# Patient Record
Sex: FEMALE | Race: WHITE | Employment: OTHER | URBAN - METROPOLITAN AREA
[De-identification: names, ages, dates, MRNs, and addresses within clinical notes are randomized per-mention and may not be internally consistent; named-entity substitution may affect disease eponyms.]

---

## 2017-02-22 ENCOUNTER — HOSPITAL ENCOUNTER (OUTPATIENT)
Dept: SURGERY | Age: 67
Discharge: HOME | End: 2017-02-22
Attending: OPHTHALMOLOGY | Admitting: OPHTHALMOLOGY

## 2017-02-22 VITALS — OXYGEN SATURATION: 96 % | HEART RATE: 73 BPM | DIASTOLIC BLOOD PRESSURE: 78 MMHG | SYSTOLIC BLOOD PRESSURE: 150 MMHG

## 2017-02-22 RX ORDER — PREDNISOLONE ACETATE 10 MG/ML
1 SUSPENSION/ DROPS OPHTHALMIC
Status: COMPLETED | OUTPATIENT
Start: 2017-02-22 | End: 2017-02-22

## 2017-02-22 RX ORDER — PILOCARPINE HYDROCHLORIDE 20 MG/ML
1 SOLUTION/ DROPS OPHTHALMIC
Status: COMPLETED | OUTPATIENT
Start: 2017-02-22 | End: 2017-02-22

## 2017-02-22 RX ORDER — PROPARACAINE HYDROCHLORIDE 5 MG/ML
1 SOLUTION/ DROPS OPHTHALMIC
Status: COMPLETED | OUTPATIENT
Start: 2017-02-22 | End: 2017-02-22

## 2017-02-22 RX ADMIN — PILOCARPINE HYDROCHLORIDE 1 DROP: 20 SOLUTION/ DROPS OPHTHALMIC at 13:03

## 2017-02-22 RX ADMIN — PROPARACAINE HYDROCHLORIDE 1 DROP: 5 SOLUTION/ DROPS OPHTHALMIC at 13:45

## 2017-02-22 RX ADMIN — PREDNISOLONE ACETATE 1 DROP: 10 SUSPENSION/ DROPS OPHTHALMIC at 13:50

## 2017-03-22 ENCOUNTER — HOSPITAL ENCOUNTER (OUTPATIENT)
Dept: SURGERY | Age: 67
Discharge: HOME | End: 2017-03-22
Attending: OPHTHALMOLOGY | Admitting: OPHTHALMOLOGY

## 2017-03-22 VITALS — DIASTOLIC BLOOD PRESSURE: 78 MMHG | HEART RATE: 73 BPM | OXYGEN SATURATION: 97 % | SYSTOLIC BLOOD PRESSURE: 163 MMHG

## 2017-03-22 RX ORDER — PROPARACAINE HYDROCHLORIDE 5 MG/ML
1 SOLUTION/ DROPS OPHTHALMIC
Status: COMPLETED | OUTPATIENT
Start: 2017-03-22 | End: 2017-03-22

## 2017-03-22 RX ORDER — PREDNISOLONE ACETATE 10 MG/ML
1 SUSPENSION/ DROPS OPHTHALMIC
Status: COMPLETED | OUTPATIENT
Start: 2017-03-22 | End: 2017-03-22

## 2017-03-22 RX ORDER — PILOCARPINE HYDROCHLORIDE 20 MG/ML
1 SOLUTION/ DROPS OPHTHALMIC
Status: COMPLETED | OUTPATIENT
Start: 2017-03-22 | End: 2017-03-22

## 2017-03-22 RX ADMIN — PILOCARPINE HYDROCHLORIDE 1 DROP: 20 SOLUTION/ DROPS OPHTHALMIC at 13:20

## 2017-03-22 RX ADMIN — PROPARACAINE HYDROCHLORIDE 1 DROP: 5 SOLUTION/ DROPS OPHTHALMIC at 14:10

## 2017-03-22 RX ADMIN — PREDNISOLONE ACETATE 1 DROP: 10 SUSPENSION/ DROPS OPHTHALMIC at 14:16

## 2017-04-03 ENCOUNTER — OFFICE VISIT (OUTPATIENT)
Dept: PULMONOLOGY | Age: 67
End: 2017-04-03

## 2017-04-03 VITALS
HEART RATE: 78 BPM | WEIGHT: 200 LBS | RESPIRATION RATE: 16 BRPM | TEMPERATURE: 97.8 F | SYSTOLIC BLOOD PRESSURE: 122 MMHG | BODY MASS INDEX: 31.39 KG/M2 | OXYGEN SATURATION: 98 % | HEIGHT: 67 IN | DIASTOLIC BLOOD PRESSURE: 70 MMHG

## 2017-04-03 DIAGNOSIS — J44.9 ASTHMA WITH COPD (HCC): Primary | ICD-10-CM

## 2017-04-03 PROCEDURE — 3014F SCREEN MAMMO DOC REV: CPT | Performed by: INTERNAL MEDICINE

## 2017-04-03 PROCEDURE — 4040F PNEUMOC VAC/ADMIN/RCVD: CPT | Performed by: INTERNAL MEDICINE

## 2017-04-03 PROCEDURE — G8400 PT W/DXA NO RESULTS DOC: HCPCS | Performed by: INTERNAL MEDICINE

## 2017-04-03 PROCEDURE — 99213 OFFICE O/P EST LOW 20 MIN: CPT | Performed by: INTERNAL MEDICINE

## 2017-04-03 PROCEDURE — G8427 DOCREV CUR MEDS BY ELIG CLIN: HCPCS | Performed by: INTERNAL MEDICINE

## 2017-04-03 PROCEDURE — 1090F PRES/ABSN URINE INCON ASSESS: CPT | Performed by: INTERNAL MEDICINE

## 2017-04-03 PROCEDURE — G8417 CALC BMI ABV UP PARAM F/U: HCPCS | Performed by: INTERNAL MEDICINE

## 2017-04-03 PROCEDURE — 3023F SPIROM DOC REV: CPT | Performed by: INTERNAL MEDICINE

## 2017-04-03 PROCEDURE — 3017F COLORECTAL CA SCREEN DOC REV: CPT | Performed by: INTERNAL MEDICINE

## 2017-04-03 PROCEDURE — 1123F ACP DISCUSS/DSCN MKR DOCD: CPT | Performed by: INTERNAL MEDICINE

## 2017-04-03 PROCEDURE — G8926 SPIRO NO PERF OR DOC: HCPCS | Performed by: INTERNAL MEDICINE

## 2017-04-03 PROCEDURE — 1036F TOBACCO NON-USER: CPT | Performed by: INTERNAL MEDICINE

## 2017-04-03 RX ORDER — BUDESONIDE AND FORMOTEROL FUMARATE DIHYDRATE 80; 4.5 UG/1; UG/1
2 AEROSOL RESPIRATORY (INHALATION) 2 TIMES DAILY
Qty: 10.2 G | Refills: 5 | Status: SHIPPED | OUTPATIENT
Start: 2017-04-03 | End: 2018-06-29 | Stop reason: SDUPTHER

## 2017-04-03 RX ORDER — ALBUTEROL SULFATE 90 UG/1
2 AEROSOL, METERED RESPIRATORY (INHALATION) EVERY 6 HOURS PRN
Qty: 8 G | Refills: 5 | Status: SHIPPED | OUTPATIENT
Start: 2017-04-03 | End: 2019-09-23 | Stop reason: SDUPTHER

## 2017-04-04 ASSESSMENT — ENCOUNTER SYMPTOMS: SHORTNESS OF BREATH: 1

## 2017-04-07 ENCOUNTER — HOSPITAL ENCOUNTER (OUTPATIENT)
Dept: SURGERY | Age: 67
Discharge: HOME | End: 2017-04-07
Attending: ORTHOPAEDIC SURGERY | Admitting: ORTHOPAEDIC SURGERY

## 2017-04-07 VITALS
DIASTOLIC BLOOD PRESSURE: 59 MMHG | HEIGHT: 67 IN | HEART RATE: 84 BPM | RESPIRATION RATE: 17 BRPM | WEIGHT: 200 LBS | SYSTOLIC BLOOD PRESSURE: 131 MMHG | TEMPERATURE: 97 F | OXYGEN SATURATION: 97 % | BODY MASS INDEX: 31.39 KG/M2

## 2017-04-07 DIAGNOSIS — M48.02 CERVICAL STENOSIS OF SPINE: ICD-10-CM

## 2017-04-07 LAB
ABO/RH: NORMAL
ANION GAP SERPL CALCULATED.3IONS-SCNC: 9 MMOL/L (ref 3–16)
ANTIBODY SCREEN: NORMAL
BASOPHILS ABSOLUTE: 0 K/UL (ref 0–0.2)
BASOPHILS RELATIVE PERCENT: 0.5 %
BUN BLDV-MCNC: 12 MG/DL (ref 7–20)
CALCIUM SERPL-MCNC: 9.2 MG/DL (ref 8.3–10.6)
CHLORIDE BLD-SCNC: 101 MMOL/L (ref 99–110)
CO2: 30 MMOL/L (ref 21–32)
CREAT SERPL-MCNC: <0.5 MG/DL (ref 0.6–1.2)
EOSINOPHILS ABSOLUTE: 0.1 K/UL (ref 0–0.6)
EOSINOPHILS RELATIVE PERCENT: 2 %
GFR AFRICAN AMERICAN: >60
GFR NON-AFRICAN AMERICAN: >60
GLUCOSE BLD-MCNC: 104 MG/DL (ref 70–99)
HCT VFR BLD CALC: 39.1 % (ref 36–48)
HEMOGLOBIN: 13 G/DL (ref 12–16)
LYMPHOCYTES ABSOLUTE: 1.9 K/UL (ref 1–5.1)
LYMPHOCYTES RELATIVE PERCENT: 27.4 %
MCH RBC QN AUTO: 29.4 PG (ref 26–34)
MCHC RBC AUTO-ENTMCNC: 33.2 G/DL (ref 31–36)
MCV RBC AUTO: 88.5 FL (ref 80–100)
MONOCYTES ABSOLUTE: 0.7 K/UL (ref 0–1.3)
MONOCYTES RELATIVE PERCENT: 10 %
NEUTROPHILS ABSOLUTE: 4.1 K/UL (ref 1.7–7.7)
NEUTROPHILS RELATIVE PERCENT: 60.1 %
PDW BLD-RTO: 13.5 % (ref 12.4–15.4)
PLATELET # BLD: 257 K/UL (ref 135–450)
PMV BLD AUTO: 7.1 FL (ref 5–10.5)
POTASSIUM SERPL-SCNC: 4 MMOL/L (ref 3.5–5.1)
RBC # BLD: 4.41 M/UL (ref 4–5.2)
SODIUM BLD-SCNC: 140 MMOL/L (ref 136–145)
WBC # BLD: 6.9 K/UL (ref 4–11)

## 2017-04-07 RX ORDER — SODIUM CHLORIDE 0.9 % (FLUSH) 0.9 %
10 SYRINGE (ML) INJECTION EVERY 12 HOURS SCHEDULED
Status: DISCONTINUED | OUTPATIENT
Start: 2017-04-07 | End: 2017-04-08 | Stop reason: HOSPADM

## 2017-04-07 RX ORDER — LABETALOL HYDROCHLORIDE 5 MG/ML
5 INJECTION, SOLUTION INTRAVENOUS EVERY 10 MIN PRN
Status: DISCONTINUED | OUTPATIENT
Start: 2017-04-07 | End: 2017-04-08 | Stop reason: HOSPADM

## 2017-04-07 RX ORDER — DIPHENHYDRAMINE HYDROCHLORIDE 50 MG/ML
12.5 INJECTION INTRAMUSCULAR; INTRAVENOUS
Status: ACTIVE | OUTPATIENT
Start: 2017-04-07 | End: 2017-04-07

## 2017-04-07 RX ORDER — HYDROMORPHONE HCL 110MG/55ML
0.5 PATIENT CONTROLLED ANALGESIA SYRINGE INTRAVENOUS EVERY 5 MIN PRN
Status: DISCONTINUED | OUTPATIENT
Start: 2017-04-07 | End: 2017-04-08 | Stop reason: HOSPADM

## 2017-04-07 RX ORDER — OXYCODONE HYDROCHLORIDE AND ACETAMINOPHEN 5; 325 MG/1; MG/1
1 TABLET ORAL PRN
Status: ACTIVE | OUTPATIENT
Start: 2017-04-07 | End: 2017-04-07

## 2017-04-07 RX ORDER — MORPHINE SULFATE 4 MG/ML
2 INJECTION, SOLUTION INTRAMUSCULAR; INTRAVENOUS EVERY 5 MIN PRN
Status: DISCONTINUED | OUTPATIENT
Start: 2017-04-07 | End: 2017-04-08 | Stop reason: HOSPADM

## 2017-04-07 RX ORDER — HYDRALAZINE HYDROCHLORIDE 20 MG/ML
5 INJECTION INTRAMUSCULAR; INTRAVENOUS
Status: DISCONTINUED | OUTPATIENT
Start: 2017-04-07 | End: 2017-04-08 | Stop reason: HOSPADM

## 2017-04-07 RX ORDER — OXYCODONE HYDROCHLORIDE AND ACETAMINOPHEN 5; 325 MG/1; MG/1
2 TABLET ORAL PRN
Status: ACTIVE | OUTPATIENT
Start: 2017-04-07 | End: 2017-04-07

## 2017-04-07 RX ORDER — PREDNISONE 10 MG/1
10 TABLET ORAL DAILY
Qty: 10 TABLET | Refills: 0 | Status: SHIPPED | OUTPATIENT
Start: 2017-04-07 | End: 2017-04-17

## 2017-04-07 RX ORDER — MEPERIDINE HYDROCHLORIDE 25 MG/ML
12.5 INJECTION INTRAMUSCULAR; INTRAVENOUS; SUBCUTANEOUS EVERY 5 MIN PRN
Status: DISCONTINUED | OUTPATIENT
Start: 2017-04-07 | End: 2017-04-08 | Stop reason: HOSPADM

## 2017-04-07 RX ORDER — MORPHINE SULFATE 4 MG/ML
1 INJECTION, SOLUTION INTRAMUSCULAR; INTRAVENOUS EVERY 5 MIN PRN
Status: DISCONTINUED | OUTPATIENT
Start: 2017-04-07 | End: 2017-04-08 | Stop reason: HOSPADM

## 2017-04-07 RX ORDER — SODIUM CHLORIDE 0.9 % (FLUSH) 0.9 %
10 SYRINGE (ML) INJECTION PRN
Status: DISCONTINUED | OUTPATIENT
Start: 2017-04-07 | End: 2017-04-08 | Stop reason: HOSPADM

## 2017-04-07 RX ORDER — HYDROMORPHONE HCL 110MG/55ML
0.25 PATIENT CONTROLLED ANALGESIA SYRINGE INTRAVENOUS EVERY 5 MIN PRN
Status: DISCONTINUED | OUTPATIENT
Start: 2017-04-07 | End: 2017-04-08 | Stop reason: HOSPADM

## 2017-04-07 RX ORDER — HYDROCODONE BITARTRATE AND ACETAMINOPHEN 7.5; 325 MG/1; MG/1
1 TABLET ORAL EVERY 8 HOURS PRN
Qty: 40 TABLET | Refills: 0 | Status: SHIPPED | OUTPATIENT
Start: 2017-04-07 | End: 2017-04-14

## 2017-04-07 RX ORDER — SODIUM CHLORIDE, SODIUM LACTATE, POTASSIUM CHLORIDE, CALCIUM CHLORIDE 600; 310; 30; 20 MG/100ML; MG/100ML; MG/100ML; MG/100ML
INJECTION, SOLUTION INTRAVENOUS CONTINUOUS
Status: DISCONTINUED | OUTPATIENT
Start: 2017-04-07 | End: 2017-04-08 | Stop reason: HOSPADM

## 2017-04-07 RX ORDER — ACETAMINOPHEN 10 MG/ML
1000 INJECTION, SOLUTION INTRAVENOUS ONCE
Status: COMPLETED | OUTPATIENT
Start: 2017-04-07 | End: 2017-04-07

## 2017-04-07 RX ORDER — ONDANSETRON 2 MG/ML
4 INJECTION INTRAMUSCULAR; INTRAVENOUS
Status: COMPLETED | OUTPATIENT
Start: 2017-04-07 | End: 2017-04-07

## 2017-04-07 RX ORDER — LIDOCAINE HYDROCHLORIDE 10 MG/ML
0.3 INJECTION, SOLUTION EPIDURAL; INFILTRATION; INTRACAUDAL; PERINEURAL
Status: COMPLETED | OUTPATIENT
Start: 2017-04-07 | End: 2017-04-07

## 2017-04-07 RX ADMIN — SODIUM CHLORIDE, SODIUM LACTATE, POTASSIUM CHLORIDE, CALCIUM CHLORIDE: 600; 310; 30; 20 INJECTION, SOLUTION INTRAVENOUS at 06:52

## 2017-04-07 RX ADMIN — ONDANSETRON 4 MG: 2 INJECTION INTRAMUSCULAR; INTRAVENOUS at 10:55

## 2017-04-07 RX ADMIN — ACETAMINOPHEN 1000 MG: 10 INJECTION, SOLUTION INTRAVENOUS at 06:52

## 2017-04-07 RX ADMIN — LIDOCAINE HYDROCHLORIDE 0.3 ML: 10 INJECTION, SOLUTION EPIDURAL; INFILTRATION; INTRACAUDAL; PERINEURAL at 06:52

## 2017-04-07 ASSESSMENT — PAIN - FUNCTIONAL ASSESSMENT: PAIN_FUNCTIONAL_ASSESSMENT: 0-10

## 2017-04-21 ENCOUNTER — HOSPITAL ENCOUNTER (OUTPATIENT)
Dept: MRI IMAGING | Age: 67
Discharge: HOME | End: 2017-04-21
Admitting: ORTHOPAEDIC SURGERY

## 2017-04-21 DIAGNOSIS — M25.511 RIGHT SHOULDER PAIN, UNSPECIFIED CHRONICITY: ICD-10-CM

## 2017-04-21 DIAGNOSIS — M25.511 PAIN IN RIGHT SHOULDER: ICD-10-CM

## 2017-08-30 LAB
BUN BLDV-MCNC: 10 MG/DL (ref 5–19)
CREAT SERPL-MCNC: 0.7 MG/DL (ref 0.6–1.3)
GFR CALCULATED: 84

## 2017-08-31 ENCOUNTER — OFFICE VISIT (OUTPATIENT)
Dept: PULMONOLOGY | Age: 67
End: 2017-08-31

## 2017-08-31 VITALS
DIASTOLIC BLOOD PRESSURE: 80 MMHG | HEART RATE: 81 BPM | SYSTOLIC BLOOD PRESSURE: 132 MMHG | WEIGHT: 181 LBS | RESPIRATION RATE: 16 BRPM | HEIGHT: 67 IN | TEMPERATURE: 98.1 F | BODY MASS INDEX: 28.41 KG/M2 | OXYGEN SATURATION: 97 %

## 2017-08-31 DIAGNOSIS — J44.9 ASTHMA WITH COPD (HCC): Primary | ICD-10-CM

## 2017-08-31 PROCEDURE — G8926 SPIRO NO PERF OR DOC: HCPCS | Performed by: INTERNAL MEDICINE

## 2017-08-31 PROCEDURE — 3014F SCREEN MAMMO DOC REV: CPT | Performed by: INTERNAL MEDICINE

## 2017-08-31 PROCEDURE — 3017F COLORECTAL CA SCREEN DOC REV: CPT | Performed by: INTERNAL MEDICINE

## 2017-08-31 PROCEDURE — G8417 CALC BMI ABV UP PARAM F/U: HCPCS | Performed by: INTERNAL MEDICINE

## 2017-08-31 PROCEDURE — 4040F PNEUMOC VAC/ADMIN/RCVD: CPT | Performed by: INTERNAL MEDICINE

## 2017-08-31 PROCEDURE — 1123F ACP DISCUSS/DSCN MKR DOCD: CPT | Performed by: INTERNAL MEDICINE

## 2017-08-31 PROCEDURE — G8427 DOCREV CUR MEDS BY ELIG CLIN: HCPCS | Performed by: INTERNAL MEDICINE

## 2017-08-31 PROCEDURE — G8400 PT W/DXA NO RESULTS DOC: HCPCS | Performed by: INTERNAL MEDICINE

## 2017-08-31 PROCEDURE — 1090F PRES/ABSN URINE INCON ASSESS: CPT | Performed by: INTERNAL MEDICINE

## 2017-08-31 PROCEDURE — 3023F SPIROM DOC REV: CPT | Performed by: INTERNAL MEDICINE

## 2017-08-31 PROCEDURE — 99213 OFFICE O/P EST LOW 20 MIN: CPT | Performed by: INTERNAL MEDICINE

## 2017-08-31 PROCEDURE — 1036F TOBACCO NON-USER: CPT | Performed by: INTERNAL MEDICINE

## 2017-08-31 RX ORDER — NIFEDIPINE 30 MG/1
TABLET, EXTENDED RELEASE ORAL
COMMUNITY
Start: 2017-08-15 | End: 2022-10-18

## 2017-10-03 ENCOUNTER — TELEPHONE (OUTPATIENT)
Dept: PULMONOLOGY | Age: 67
End: 2017-10-03

## 2017-10-03 NOTE — TELEPHONE ENCOUNTER
Rec'd fax from [facility] stating that pt was willing to have an ONPO done. Per Dr. Gordon Espinoza, pt can be offered an appt to discuss. Spoke with pt and she states that [facility] reached out to her a couple weeks ago, but the pt is not requesting this. Pt was not sure why [address] is even asking for ONPO to be done. PT does not want to proceed with ONPO at this time.      OV 8/31/17:    ASSESSMENT AND PLAN:  Tobacco abuse   - quit smoking in 3/15.   - avoid smoking     Asthma with COPD   - Continue inhaled bronchodilator therapy ( albuterol prn); ok for symbicort 80 bid  - Patient is up to date with Pneumococcal vaccine in 2010   - Already quit smoking.   -- doing well

## 2018-03-07 ENCOUNTER — OFFICE VISIT (OUTPATIENT)
Dept: PULMONOLOGY | Age: 68
End: 2018-03-07

## 2018-03-07 VITALS
SYSTOLIC BLOOD PRESSURE: 120 MMHG | BODY MASS INDEX: 29 KG/M2 | DIASTOLIC BLOOD PRESSURE: 78 MMHG | HEIGHT: 67 IN | OXYGEN SATURATION: 96 % | WEIGHT: 184.8 LBS | RESPIRATION RATE: 20 BRPM | HEART RATE: 82 BPM

## 2018-03-07 DIAGNOSIS — R06.02 SHORTNESS OF BREATH: ICD-10-CM

## 2018-03-07 DIAGNOSIS — J44.9 ASTHMA WITH COPD (HCC): Primary | ICD-10-CM

## 2018-03-07 PROCEDURE — G8482 FLU IMMUNIZE ORDER/ADMIN: HCPCS | Performed by: INTERNAL MEDICINE

## 2018-03-07 PROCEDURE — G8427 DOCREV CUR MEDS BY ELIG CLIN: HCPCS | Performed by: INTERNAL MEDICINE

## 2018-03-07 PROCEDURE — 3023F SPIROM DOC REV: CPT | Performed by: INTERNAL MEDICINE

## 2018-03-07 PROCEDURE — 99213 OFFICE O/P EST LOW 20 MIN: CPT | Performed by: INTERNAL MEDICINE

## 2018-03-07 PROCEDURE — G8926 SPIRO NO PERF OR DOC: HCPCS | Performed by: INTERNAL MEDICINE

## 2018-03-07 PROCEDURE — 4040F PNEUMOC VAC/ADMIN/RCVD: CPT | Performed by: INTERNAL MEDICINE

## 2018-03-07 PROCEDURE — G8400 PT W/DXA NO RESULTS DOC: HCPCS | Performed by: INTERNAL MEDICINE

## 2018-03-07 PROCEDURE — 3017F COLORECTAL CA SCREEN DOC REV: CPT | Performed by: INTERNAL MEDICINE

## 2018-03-07 PROCEDURE — 1090F PRES/ABSN URINE INCON ASSESS: CPT | Performed by: INTERNAL MEDICINE

## 2018-03-07 PROCEDURE — G8419 CALC BMI OUT NRM PARAM NOF/U: HCPCS | Performed by: INTERNAL MEDICINE

## 2018-03-07 PROCEDURE — 3014F SCREEN MAMMO DOC REV: CPT | Performed by: INTERNAL MEDICINE

## 2018-03-07 PROCEDURE — 1123F ACP DISCUSS/DSCN MKR DOCD: CPT | Performed by: INTERNAL MEDICINE

## 2018-03-07 PROCEDURE — 1036F TOBACCO NON-USER: CPT | Performed by: INTERNAL MEDICINE

## 2018-03-07 NOTE — PROGRESS NOTES
cervical LAD. No supraclavicular LAD. M/S: No cyanosis. No synovitis or joint deformity. No clubbing. R leg wrapped. Neuro: Cranial nerves are grossly intact. Moving all extremities. Motor and sensation grossly intact. Psych: No anxiety or agitation. DATA:      LABS:      PFTs 11/11/15  FVC 2.96 (89%) FEV1 1.31 (54%) FEV1/FVC ratio  44%  TLC 6.69 (123%) DLCO 16.26 (84%)      IMAGING:      I personally reviewed and interpreted the following today in the office:    CXR 11/23/15: Heart size is normal. Mediastinal contours are normal. Pulmonary vascularity is normal. No focal lung consolidation noted.  Mild spurring  seen in the spine    ASSESSMENT AND PLAN:  Tobacco abuse   - quit smoking in 3/15.   - avoid smoking    Asthma with COPD   - Continue inhaled bronchodilator therapy ( albuterol prn); ok for symbicort 80 qd- bid  - Patient is up to date with Pneumococcal vaccine in 2010   - Already quit smoking.   - still doing ok

## 2018-06-29 RX ORDER — DILTIAZEM HYDROCHLORIDE 60 MG/1
TABLET, FILM COATED ORAL
Qty: 1 INHALER | Refills: 5 | Status: SHIPPED | OUTPATIENT
Start: 2018-06-29 | End: 2018-10-30 | Stop reason: SDUPTHER

## 2018-10-30 RX ORDER — DILTIAZEM HYDROCHLORIDE 60 MG/1
TABLET, FILM COATED ORAL
Qty: 10.2 G | Refills: 5 | Status: SHIPPED | OUTPATIENT
Start: 2018-10-30 | End: 2019-05-30 | Stop reason: SDUPTHER

## 2019-03-04 ENCOUNTER — TELEPHONE (OUTPATIENT)
Dept: PULMONOLOGY | Age: 69
End: 2019-03-04

## 2019-05-31 RX ORDER — BUDESONIDE AND FORMOTEROL FUMARATE DIHYDRATE 80; 4.5 UG/1; UG/1
AEROSOL RESPIRATORY (INHALATION)
Qty: 10.2 G | Refills: 5 | Status: SHIPPED | OUTPATIENT
Start: 2019-05-31 | End: 2019-09-23 | Stop reason: SDUPTHER

## 2019-09-23 ENCOUNTER — OFFICE VISIT (OUTPATIENT)
Dept: PULMONOLOGY | Age: 69
End: 2019-09-23
Payer: MEDICARE

## 2019-09-23 VITALS
DIASTOLIC BLOOD PRESSURE: 60 MMHG | HEIGHT: 67 IN | OXYGEN SATURATION: 97 % | SYSTOLIC BLOOD PRESSURE: 128 MMHG | HEART RATE: 75 BPM | WEIGHT: 158.6 LBS | RESPIRATION RATE: 16 BRPM | BODY MASS INDEX: 24.89 KG/M2 | TEMPERATURE: 97.7 F

## 2019-09-23 DIAGNOSIS — J44.9 ASTHMA WITH COPD (HCC): ICD-10-CM

## 2019-09-23 DIAGNOSIS — Z72.0 TOBACCO ABUSE: Primary | ICD-10-CM

## 2019-09-23 PROCEDURE — G8420 CALC BMI NORM PARAMETERS: HCPCS | Performed by: INTERNAL MEDICINE

## 2019-09-23 PROCEDURE — G8926 SPIRO NO PERF OR DOC: HCPCS | Performed by: INTERNAL MEDICINE

## 2019-09-23 PROCEDURE — 99213 OFFICE O/P EST LOW 20 MIN: CPT | Performed by: INTERNAL MEDICINE

## 2019-09-23 PROCEDURE — 3023F SPIROM DOC REV: CPT | Performed by: INTERNAL MEDICINE

## 2019-09-23 PROCEDURE — 1036F TOBACCO NON-USER: CPT | Performed by: INTERNAL MEDICINE

## 2019-09-23 PROCEDURE — 1090F PRES/ABSN URINE INCON ASSESS: CPT | Performed by: INTERNAL MEDICINE

## 2019-09-23 PROCEDURE — 4040F PNEUMOC VAC/ADMIN/RCVD: CPT | Performed by: INTERNAL MEDICINE

## 2019-09-23 PROCEDURE — G8427 DOCREV CUR MEDS BY ELIG CLIN: HCPCS | Performed by: INTERNAL MEDICINE

## 2019-09-23 PROCEDURE — G8400 PT W/DXA NO RESULTS DOC: HCPCS | Performed by: INTERNAL MEDICINE

## 2019-09-23 PROCEDURE — 3017F COLORECTAL CA SCREEN DOC REV: CPT | Performed by: INTERNAL MEDICINE

## 2019-09-23 PROCEDURE — 1123F ACP DISCUSS/DSCN MKR DOCD: CPT | Performed by: INTERNAL MEDICINE

## 2019-09-23 RX ORDER — THYROID, PORCINE 60 MG/1
65 TABLET ORAL DAILY
COMMUNITY
End: 2022-10-18

## 2019-09-23 RX ORDER — ALBUTEROL SULFATE 90 UG/1
2 AEROSOL, METERED RESPIRATORY (INHALATION) EVERY 6 HOURS PRN
Qty: 8 G | Refills: 11 | Status: SHIPPED | OUTPATIENT
Start: 2019-09-23 | End: 2020-05-20

## 2019-09-23 RX ORDER — BUDESONIDE AND FORMOTEROL FUMARATE DIHYDRATE 80; 4.5 UG/1; UG/1
AEROSOL RESPIRATORY (INHALATION)
Qty: 10.2 G | Refills: 11 | Status: SHIPPED | OUTPATIENT
Start: 2019-09-23 | End: 2020-10-01

## 2019-09-23 NOTE — PROGRESS NOTES
 SKIN GRAFT      TONSILLECTOMY         Allergies:  has No Known Allergies. Social History:    TOBACCO:   reports that she quit smoking about 4 years ago. Her smoking use included cigarettes. She has a 75.00 pack-year smoking history. She has never used smokeless tobacco.  ETOH:   reports that she drinks alcohol. Patient currently lives independently      Family History:       Adopted: Yes   Problem Relation Age of Onset    No Known Problems Mother     No Known Problems Father        Current Medications:    Current Outpatient Medications:     budesonide-formoterol (SYMBICORT) 80-4.5 MCG/ACT AERO, INHALE 2 PUFFS INTO THE LUNGS TWO TIMES A DAY, Disp: 10.2 g, Rfl: 5    Ergocalciferol (VITAMIN D2 PO), Take 1.25 mg by mouth Twice weekly', Disp: , Rfl:     NIFEdipine (PROCARDIA XL) 30 MG extended release tablet, , Disp: , Rfl:     albuterol sulfate  (90 BASE) MCG/ACT inhaler, Inhale 2 puffs into the lungs every 6 hours as needed for Wheezing, Disp: 8 g, Rfl: 5    omeprazole (PRILOSEC) 20 MG capsule, Take 40 mg by mouth daily , Disp: , Rfl:     albuterol (PROVENTIL) (2.5 MG/3ML) 0.083% nebulizer solution, Take 2.5 mg by nebulization as needed , Disp: , Rfl:     losartan (COZAAR) 25 MG tablet, Take 50 mg by mouth daily , Disp: , Rfl:     Progesterone Micronized (PROGESTERONE PO), Take 200 mg by mouth daily, Disp: , Rfl:     Nutritional Supplements (DHEA PO), DHEA  1 PO DAILY, Disp: , Rfl:     aspirin 81 MG tablet, Take 81 mg by mouth daily, Disp: , Rfl:       Objective:   PHYSICAL EXAM:        VITALS:    /60 (Site: Right Upper Arm, Position: Sitting)   Pulse 75   Temp 97.7 °F (36.5 °C) (Oral)   Resp 16   Ht 5' 7\" (1.702 m)   Wt 158 lb 9.6 oz (71.9 kg)   SpO2 97% Comment: RA  BMI 24.84 kg/m²     Constitutional: In no acute distress. Appears stated age. Comfortable. Eyes: PERRL. No sclera icterus. No conjunctival injection. ENT: No ocular or auricular discharge or visible masses.

## 2020-05-20 RX ORDER — ALBUTEROL SULFATE 90 MCG
HFA AEROSOL WITH ADAPTER (GRAM) INHALATION
Qty: 6.7 G | Refills: 5 | Status: SHIPPED | OUTPATIENT
Start: 2020-05-20 | End: 2020-08-27

## 2020-06-24 ENCOUNTER — TELEPHONE (OUTPATIENT)
Dept: SURGERY | Age: 70
End: 2020-06-24

## 2020-08-03 RX ORDER — ALBUTEROL SULFATE 90 MCG
HFA AEROSOL WITH ADAPTER (GRAM) INHALATION
Qty: 6.7 G | Refills: 0 | OUTPATIENT
Start: 2020-08-03

## 2020-08-27 RX ORDER — ALBUTEROL SULFATE 90 MCG
HFA AEROSOL WITH ADAPTER (GRAM) INHALATION
Qty: 6.7 G | Refills: 5 | Status: SHIPPED | OUTPATIENT
Start: 2020-08-27 | End: 2020-12-23 | Stop reason: SDUPTHER

## 2020-09-15 ENCOUNTER — TELEPHONE (OUTPATIENT)
Dept: PULMONOLOGY | Age: 70
End: 2020-09-15

## 2020-09-21 ENCOUNTER — VIRTUAL VISIT (OUTPATIENT)
Dept: PULMONOLOGY | Age: 70
End: 2020-09-21
Payer: MEDICARE

## 2020-09-21 PROCEDURE — 1090F PRES/ABSN URINE INCON ASSESS: CPT | Performed by: INTERNAL MEDICINE

## 2020-09-21 PROCEDURE — G8400 PT W/DXA NO RESULTS DOC: HCPCS | Performed by: INTERNAL MEDICINE

## 2020-09-21 PROCEDURE — 3017F COLORECTAL CA SCREEN DOC REV: CPT | Performed by: INTERNAL MEDICINE

## 2020-09-21 PROCEDURE — 4040F PNEUMOC VAC/ADMIN/RCVD: CPT | Performed by: INTERNAL MEDICINE

## 2020-09-21 PROCEDURE — 1123F ACP DISCUSS/DSCN MKR DOCD: CPT | Performed by: INTERNAL MEDICINE

## 2020-09-21 PROCEDURE — 99213 OFFICE O/P EST LOW 20 MIN: CPT | Performed by: INTERNAL MEDICINE

## 2020-09-21 PROCEDURE — G8427 DOCREV CUR MEDS BY ELIG CLIN: HCPCS | Performed by: INTERNAL MEDICINE

## 2020-09-21 NOTE — PROGRESS NOTES
2020    TELEHEALTH EVALUATION -- Audio/Visual (During DZNVA-71 public health emergency)    HPI:    Ariana Hein (:  1950) has requested an audio/video evaluation for the following concern(s): COPD    Since last clinic visit, the patient reports that she started smoking again- up to a pack a day. She feels more short of breath. She has been using symbicort as prescribed. She has been having a sore throat recently and went to her PMD.  She also notes dry mouth which she was concerned could be related to the Symbicort. Review of Systems    Prior to Visit Medications    Medication Sig Taking? Authorizing Provider   PROVENTIL  (90 Base) MCG/ACT inhaler INHALE 2 PUFFS BY MOUTH EVERY SIX HOURS AS NEEDED Yes Cordelia Rebolledo MD   Progesterone Micronized (PROGESTERONE PO) Take 200 mg by mouth daily Yes Historical Provider, MD   Nutritional Supplements (DHEA PO) DHEA   1 PO DAILY Yes Historical Provider, MD   thyroid (NATURE-THROID) 65 MG tablet Take 65 mg by mouth daily Yes Historical Provider, MD   UNABLE TO FIND Estrogen and progesterone pellets Yes Historical Provider, MD   budesonide-formoterol (SYMBICORT) 80-4.5 MCG/ACT AERO INHALE 2 PUFFS INTO THE LUNGS TWO TIMES A DAY Yes Cordelia Rebolledo MD   Ergocalciferol (VITAMIN D2 PO) Take 1.25 mg by mouth Twice weekly' Yes Historical Provider, MD   NIFEdipine (PROCARDIA XL) 30 MG extended release tablet  Yes Historical Provider, MD   omeprazole (PRILOSEC) 20 MG capsule Take 40 mg by mouth daily  Yes Historical Provider, MD   albuterol (PROVENTIL) (2.5 MG/3ML) 0.083% nebulizer solution Take 2.5 mg by nebulization as needed  Yes Historical Provider, MD       Social History     Tobacco Use    Smoking status: Former Smoker     Packs/day: 1.50     Years: 50.00     Pack years: 75.00     Types: Cigarettes     Last attempt to quit: 2015     Years since quittin.5    Smokeless tobacco: Never Used   Substance Use Topics    Alcohol use:  Yes Alcohol/week: 0.0 standard drinks     Comment: occassional    Drug use: No            PHYSICAL EXAMINATION:  [ INSTRUCTIONS:  \"[x]\" Indicates a positive item  \"[]\" Indicates a negative item  -- DELETE ALL ITEMS NOT EXAMINED]  Vital Signs: (As obtained by patient/caregiver or practitioner observation)    Blood pressure-  Heart rate-    Respiratory rate-    Temperature-  Pulse oximetry-     Constitutional: [x] Appears well-developed and well-nourished [x] No apparent distress      [] Abnormal-   Mental status  [x] Alert and awake  [x] Oriented to person/place/time [x]Able to follow commands      Eyes:  EOM    [x]  Normal  [] Abnormal-  Sclera  [x]  Normal  [] Abnormal -         Discharge [x]  None visible  [] Abnormal -    HENT:   [x] Normocephalic, atraumatic.   [] Abnormal   [x] Mouth/Throat: Mucous membranes are moist.     External Ears [x] Normal  [] Abnormal-     Neck: [x] No visualized mass     Pulmonary/Chest: [x] Respiratory effort normal.  [x] No visualized signs of difficulty breathing or respiratory distress        [] Abnormal-      Musculoskeletal:   [] Normal gait with no signs of ataxia         [x] Normal range of motion of neck        [] Abnormal-       Neurological:        [x] No Facial Asymmetry (Cranial nerve 7 motor function) (limited exam to video visit)          [x] No gaze palsy        [] Abnormal-         Skin:        [x] No significant exanthematous lesions or discoloration noted on facial skin         [] Abnormal-            Psychiatric:       [x] Normal Affect [x] No Hallucinations        [] Abnormal-     Other pertinent observable physical exam findings-     ASSESSMENT AND PLAN:  Tobacco abuse   - advise smoking cessation again- quit again on 9/17  - avoid smoking     Asthma with COPD   - Continue inhaled bronchodilator therapy ( albuterol prn); ok for symbicort 80 qd- bid  - Patient is up to date with Pneumococcal vaccine in 2010    - regular use of twice daily Symbicort- advised salt water gargling  -Recommend over-the-counter antihistamines      Kb Courtney is a 72 y.o. female being evaluated by a Virtual Visit (video visit) encounter to address concerns as mentioned above. A caregiver was present when appropriate. Due to this being a TeleHealth encounter (During CFAXZ-72 public health emergency), evaluation of the following organ systems was limited: Vitals/Constitutional/EENT/Resp/CV/GI//MS/Neuro/Skin/Heme-Lymph-Imm. Pursuant to the emergency declaration under the [address] [place] [address], [address] [place] [address] authority and the [employer] and [employer] Act, this Virtual Visit was conducted with patient's (and/or legal guardian's) consent, to reduce the patient's risk of exposure to COVID-19 and provide necessary medical care. The patient (and/or legal guardian) has also been advised to contact this office for worsening conditions or problems, and seek emergency medical treatment and/or call 911 if deemed necessary. Patient identification was verified at the start of the visit: Yes    Total time spent on this encounter: Not billed by time    Services were provided through a video synchronous discussion virtually to substitute for in-person clinic visit. Patient and provider were located at their individual homes. --Taty Berger MD on 9/21/2020 at 9:01 AM    An electronic signature was used to authenticate this note.       Orders  - F/u in 3 months

## 2020-10-01 RX ORDER — BUDESONIDE AND FORMOTEROL FUMARATE DIHYDRATE 80; 4.5 UG/1; UG/1
AEROSOL RESPIRATORY (INHALATION)
Qty: 1 INHALER | Refills: 5 | Status: SHIPPED | OUTPATIENT
Start: 2020-10-01 | End: 2021-04-16

## 2020-12-21 ENCOUNTER — VIRTUAL VISIT (OUTPATIENT)
Dept: PULMONOLOGY | Age: 70
End: 2020-12-21
Payer: MEDICARE

## 2020-12-21 ENCOUNTER — TELEPHONE (OUTPATIENT)
Dept: PULMONOLOGY | Age: 70
End: 2020-12-21

## 2020-12-21 PROCEDURE — 3017F COLORECTAL CA SCREEN DOC REV: CPT | Performed by: INTERNAL MEDICINE

## 2020-12-21 PROCEDURE — 1090F PRES/ABSN URINE INCON ASSESS: CPT | Performed by: INTERNAL MEDICINE

## 2020-12-21 PROCEDURE — G8427 DOCREV CUR MEDS BY ELIG CLIN: HCPCS | Performed by: INTERNAL MEDICINE

## 2020-12-21 PROCEDURE — G8400 PT W/DXA NO RESULTS DOC: HCPCS | Performed by: INTERNAL MEDICINE

## 2020-12-21 PROCEDURE — 1123F ACP DISCUSS/DSCN MKR DOCD: CPT | Performed by: INTERNAL MEDICINE

## 2020-12-21 PROCEDURE — 4040F PNEUMOC VAC/ADMIN/RCVD: CPT | Performed by: INTERNAL MEDICINE

## 2020-12-21 PROCEDURE — 99214 OFFICE O/P EST MOD 30 MIN: CPT | Performed by: INTERNAL MEDICINE

## 2020-12-21 RX ORDER — AMOXICILLIN AND CLAVULANATE POTASSIUM 875; 125 MG/1; MG/1
1 TABLET, FILM COATED ORAL EVERY 12 HOURS
COMMUNITY
Start: 2020-12-17 | End: 2020-12-24

## 2020-12-21 NOTE — PROGRESS NOTES
2020    TELEHEALTH EVALUATION -- Audio/Visual (During XKZCS-46 public health emergency)    HPI:    Charlene Riley (:  1950) has requested an audio/video evaluation for the following concern(s): COPD    Since last clinic visit, the patient reports she has been dealing with sinus congestion and swollen LN, earache. She was started on augmentin last week and is feeling better. She is smoking 1 ppd. Prior to Visit Medications    Medication Sig Taking? Authorizing Provider   budesonide-formoterol (SYMBICORT) 80-4.5 MCG/ACT AERO INHALE 2 PUFFS BY MOUTH TWO TIMES A DAY Yes Mike Dillon MD   PROVENTIL  (86 Base) MCG/ACT inhaler INHALE 2 PUFFS BY Arie Denver Yes Mike Dillon MD   Progesterone Micronized (PROGESTERONE PO) Take 200 mg by mouth daily Yes Historical Provider, MD   Nutritional Supplements (DHEA PO) DHEA   1 PO DAILY Yes Historical Provider, MD   thyroid (NATURE-THROID) 65 MG tablet Take 65 mg by mouth daily Yes Historical Provider, MD   UNABLE TO FIND Estrogen and progesterone pellets Yes Historical Provider, MD   Ergocalciferol (VITAMIN D2 PO) Take 1.25 mg by mouth once a week  Yes Historical Provider, MD   NIFEdipine (PROCARDIA XL) 30 MG extended release tablet  Yes Historical Provider, MD   omeprazole (PRILOSEC) 20 MG capsule Take 40 mg by mouth daily  Yes Historical Provider, MD   albuterol (PROVENTIL) (2.5 MG/3ML) 0.083% nebulizer solution Take 2.5 mg by nebulization as needed  Yes Historical Provider, MD       Social History     Tobacco Use    Smoking status: Former Smoker     Packs/day: 1.50     Years: 50.00     Pack years: 75.00     Types: Cigarettes     Quit date: 2015     Years since quittin.8    Smokeless tobacco: Never Used   Substance Use Topics    Alcohol use: Yes     Alcohol/week: 0.0 standard drinks     Comment: occassional    Drug use:  No            PHYSICAL EXAMINATION: - Continue inhaled bronchodilator therapy ( albuterol prn); symbicort 80 qd- bid  - Patient is up to date with Pneumococcal vaccine in 2010    - regular use of twice daily Symbicort- advised salt water gargling  -Recommend over-the-counter antihistamines      Lorie White is a 72 y.o. female being evaluated by a Virtual Visit (video visit) encounter to address concerns as mentioned above. A caregiver was present when appropriate. Due to this being a TeleHealth encounter (During BOCAS-31 public health emergency), evaluation of the following organ systems was limited: Vitals/Constitutional/EENT/Resp/CV/GI//MS/Neuro/Skin/Heme-Lymph-Imm. Pursuant to the emergency declaration under the [address] [place] [address] authority and the [employer] and [employer] Act, this Virtual Visit was conducted with patient's (and/or legal guardian's) consent, to reduce the patient's risk of exposure to COVID-19 and provide necessary medical care. The patient (and/or legal guardian) has also been advised to contact this office for worsening conditions or problems, and seek emergency medical treatment and/or call 911 if deemed necessary. Patient identification was verified at the start of the visit: Yes    Total time spent on this encounter: Not billed by time    Services were provided through a video synchronous discussion virtually to substitute for in-person clinic visit. Patient and provider were located at their individual homes. --Juhi Chavira MD on 12/21/2020 at 8:49 AM    An electronic signature was used to authenticate this note.       Orders  - LDCT of chest in 1/21  - f/u after chest CT

## 2020-12-21 NOTE — PATIENT INSTRUCTIONS
Remember to bring a list of pulmonary medications and any CPAP or BiPAP machines to your next appointment with the office. Please keep all of your future appointments scheduled by [facility] Pulmonary office. Out of respect for other patients and providers, you may be asked to reschedule your appointment if you arrive later than your scheduled appointment time. Appointments cancelled less than 24hrs in advance will be considered a no show. Patients with three missed appointments within 1 year or four missed appointments within 2 years can be dismissed from the practice. You may receive a survey regarding the care you received during your visit. Your input is valuable to us. We encourage you to complete and return your survey. We hope you will choose us in the future for your healthcare needs. Pt instructed of all future appointment dates & times, including radiology, labs, procedures & referrals. If procedures were scheduled preparation instructions provided. Instructions on future appointments with [facility] Pulmonary were given.

## 2020-12-21 NOTE — TELEPHONE ENCOUNTER
After visit care:    LDCT 1/2021  F/u VV after CT    Scheduled appts for pt, [facility] to inform pt of appts. OBHx: primigravid     GynHx: denies h/o fibroids, ovarian cysts, abnormal pap smears, STIs.

## 2020-12-23 ENCOUNTER — TELEPHONE (OUTPATIENT)
Dept: PULMONOLOGY | Age: 70
End: 2020-12-23

## 2020-12-23 RX ORDER — ALBUTEROL SULFATE 90 UG/1
AEROSOL, METERED RESPIRATORY (INHALATION)
Qty: 18 G | Refills: 5 | Status: SHIPPED | OUTPATIENT
Start: 2020-12-23 | End: 2021-04-05

## 2020-12-23 NOTE — TELEPHONE ENCOUNTER
Patient calling requesting a refill on her Proventil. Last Rx was sent in 8/2020 with 5 refills. Patient stats she does not have anymore and sometime she goes through 1 inhaler in 2 weeks. She kept stating she pays for this inhaler out of pocket so she should be able to get it when she needs it. She would like it sent to [facility]. Please advise. LOV: 12/21/20    ASSESSMENT AND PLAN:  Tobacco abuse   - advise smoking cessation again- she is back to 1 ppd  - avoid smoking  - discussed the  recommendations of the USPSTF for Lung Cancer Screening (>30 pack year history) with Low Dose CT annually, including the risks and benefits of both screening and not screening.   It is recommended that this patient obtain annual low dose screening CT scans, however, she is agreeable.     Asthma with COPD   - Continue inhaled bronchodilator therapy ( albuterol prn); symbicort 80 qd- bid  - Patient is up to date with Pneumococcal vaccine in 2010    - regular use of twice daily Symbicort- advised salt water gargling  -Recommend over-the-counter antihistamines

## 2021-01-19 ENCOUNTER — HOSPITAL ENCOUNTER (OUTPATIENT)
Dept: CT IMAGING | Age: 71
Discharge: HOME | End: 2021-01-19
Payer: MEDICARE

## 2021-01-19 DIAGNOSIS — Z87.891 PERSONAL HISTORY OF TOBACCO USE: ICD-10-CM

## 2021-01-19 PROCEDURE — 71271 CT THORAX LUNG CANCER SCR C-: CPT

## 2021-01-22 ENCOUNTER — VIRTUAL VISIT (OUTPATIENT)
Dept: PULMONOLOGY | Age: 71
End: 2021-01-22
Payer: MEDICARE

## 2021-01-22 DIAGNOSIS — J44.9 ASTHMA WITH COPD (HCC): Primary | ICD-10-CM

## 2021-01-22 DIAGNOSIS — Z72.0 TOBACCO ABUSE: ICD-10-CM

## 2021-01-22 PROCEDURE — 1090F PRES/ABSN URINE INCON ASSESS: CPT | Performed by: INTERNAL MEDICINE

## 2021-01-22 PROCEDURE — 4040F PNEUMOC VAC/ADMIN/RCVD: CPT | Performed by: INTERNAL MEDICINE

## 2021-01-22 PROCEDURE — 1123F ACP DISCUSS/DSCN MKR DOCD: CPT | Performed by: INTERNAL MEDICINE

## 2021-01-22 PROCEDURE — G8400 PT W/DXA NO RESULTS DOC: HCPCS | Performed by: INTERNAL MEDICINE

## 2021-01-22 PROCEDURE — G8427 DOCREV CUR MEDS BY ELIG CLIN: HCPCS | Performed by: INTERNAL MEDICINE

## 2021-01-22 PROCEDURE — 3017F COLORECTAL CA SCREEN DOC REV: CPT | Performed by: INTERNAL MEDICINE

## 2021-01-22 PROCEDURE — 99213 OFFICE O/P EST LOW 20 MIN: CPT | Performed by: INTERNAL MEDICINE

## 2021-01-22 RX ORDER — MELOXICAM 15 MG/1
1 TABLET ORAL DAILY
COMMUNITY
Start: 2021-01-04 | End: 2021-08-18 | Stop reason: CLARIF

## 2021-01-22 NOTE — PROGRESS NOTES
2021    TELEHEALTH EVALUATION -- Audio/Visual (During [facility]-43 public health emergency)    HPI:    Latha Miller (:  1950) has requested an audio/video evaluation for the following concern(s): COPD    Since last clinic visit, the patient reports she has been doing slightly better, less dyspnea. She is smoking <1 ppd (15 cigs per day). Prior to Visit Medications    Medication Sig Taking? Authorizing Provider   albuterol sulfate HFA (PROVENTIL HFA) 108 (90 Base) MCG/ACT inhaler INHALE 2 PUFFS BY MOUTH EVERY SIX HOURS AS NEEDED  Dominick Neal MD   budesonide-formoterol (SYMBICORT) 80-4.5 MCG/ACT AERO INHALE 2 PUFFS BY MOUTH TWO TIMES Luis Kelly MD   Progesterone Micronized (PROGESTERONE PO) Take 200 mg by mouth daily  Historical Provider, MD   Nutritional Supplements (DHEA PO) DHEA   1 PO DAILY  Historical Provider, MD   thyroid (NATURE-THROID) 65 MG tablet Take 65 mg by mouth daily  Historical Provider, MD   UNABLE TO FIND Estrogen and progesterone pellets  Historical Provider, MD   Ergocalciferol (VITAMIN D2 PO) Take 1.25 mg by mouth once a week   Historical Provider, MD   NIFEdipine (PROCARDIA XL) 30 MG extended release tablet   Historical Provider, MD   omeprazole (PRILOSEC) 20 MG capsule Take 40 mg by mouth daily   Historical Provider, MD   albuterol (PROVENTIL) (2.5 MG/3ML) 0.083% nebulizer solution Take 2.5 mg by nebulization as needed   Historical Provider, MD       Social History     Tobacco Use    Smoking status: Former Smoker     Packs/day: 1.50     Years: 50.00     Pack years: 75.00     Types: Cigarettes     Quit date: 2015     Years since quittin.9    Smokeless tobacco: Never Used   Substance Use Topics    Alcohol use: Yes     Alcohol/week: 0.0 standard drinks     Comment: occassional    Drug use:  No            PHYSICAL EXAMINATION:  [ INSTRUCTIONS:  \"[x]\" Indicates a positive item  \"[]\" Indicates a negative item  -- DELETE ALL ITEMS NOT EXAMINED] Vital Signs: (As obtained by patient/caregiver or practitioner observation)    Blood pressure-  Heart rate-    Respiratory rate-    Temperature-  Pulse oximetry-     Constitutional: [x] Appears well-developed and well-nourished [x] No apparent distress      [] Abnormal-   Mental status  [x] Alert and awake  [x] Oriented to person/place/time [x]Able to follow commands      Eyes:  EOM    [x]  Normal  [] Abnormal-  Sclera  [x]  Normal  [] Abnormal -         Discharge [x]  None visible  [] Abnormal -    HENT:   [x] Normocephalic, atraumatic. [] Abnormal   [x] Mouth/Throat: Mucous membranes are moist.     External Ears [x] Normal  [] Abnormal-     Neck: [x] No visualized mass     Pulmonary/Chest: [x] Respiratory effort normal.  [x] No visualized signs of difficulty breathing or respiratory distress        [] Abnormal-      Musculoskeletal:   [] Normal gait with no signs of ataxia         [x] Normal range of motion of neck        [] Abnormal-       Neurological:        [x] No Facial Asymmetry (Cranial nerve 7 motor function) (limited exam to video visit)          [x] No gaze palsy        [] Abnormal-         Skin:        [x] No significant exanthematous lesions or discoloration noted on facial skin         [] Abnormal-            Psychiatric:       [x] Normal Affect [x] No Hallucinations        [] Abnormal-     Other pertinent observable physical exam findings-     I personally reviewed and interpreted the following today in the office:     Chest LDCT 1/19/21:  Lungs/Pleura:  Mild underlying emphysema is seen.  Linear and curvilinear  opacities are seen in the apices, compatible with scarring.  Scattered areas  of bronchial wall thickening are seen.  No pneumonia.  No edema.  No pleural  effusion.  Noncalcified pulmonary nodule seen in the right upper lobe  measuring 3 mm.         ASSESSMENT AND PLAN:  Tobacco abuse   - advise smoking cessation again- she is back to 1 ppd  - avoid smoking - discussed the  recommendations of the USPSTF for Lung Cancer Screening (>30 pack year history) with Low Dose CT annually, including the risks and benefits of both screening and not screening. It is recommended that this patient obtain annual low dose screening CT scans, however, she was agreeable. - next LDCT due in 1/22     Asthma with COPD   - Continue inhaled bronchodilator therapy ( albuterol prn); symbicort 80 qd- bid  - Patient is up to date with Pneumococcal vaccine in 2010    - regular use of twice daily Symbicort- advised salt water gargling  -Recommend over-the-counter antihistamines      Betsy Eisenmenger is a 72 y.o. female being evaluated by a Virtual Visit (video visit) encounter to address concerns as mentioned above. A caregiver was present when appropriate. Due to this being a TeleHealth encounter (During VJDLR-03 public health emergency), evaluation of the following organ systems was limited: Vitals/Constitutional/EENT/Resp/CV/GI//MS/Neuro/Skin/Heme-Lymph-Imm. Pursuant to the emergency declaration under the [address] [place] [address], [address] [place] [address] authority and the [employer] and [employer] Act, this Virtual Visit was conducted with patient's (and/or legal guardian's) consent, to reduce the patient's risk of exposure to COVID-19 and provide necessary medical care. The patient (and/or legal guardian) has also been advised to contact this office for worsening conditions or problems, and seek emergency medical treatment and/or call 911 if deemed necessary. Patient identification was verified at the start of the visit: Yes    Total time spent on this encounter: Not billed by time    Services were provided through a video synchronous discussion virtually to substitute for in-person clinic visit. Patient and provider were located at their individual homes.     --Damien Aragon MD on 1/22/2021 at 9:21 AM An electronic signature was used to authenticate this note.       Orders  - f/u in 6 months  - she is interested in covid vaccine when available- please put on list

## 2021-02-17 ENCOUNTER — IMMUNIZATION (OUTPATIENT)
Dept: PRIMARY CARE CLINIC | Age: 71
End: 2021-02-17
Payer: MEDICARE

## 2021-02-17 PROCEDURE — 0011A COVID-19, MODERNA VACCINE 100MCG/0.5ML DOSE: CPT | Performed by: FAMILY MEDICINE

## 2021-02-17 PROCEDURE — 91301 COVID-19, MODERNA VACCINE 100MCG/0.5ML DOSE: CPT | Performed by: FAMILY MEDICINE

## 2021-03-17 ENCOUNTER — IMMUNIZATION (OUTPATIENT)
Dept: PRIMARY CARE CLINIC | Age: 71
End: 2021-03-17
Payer: MEDICARE

## 2021-03-17 PROCEDURE — 0012A COVID-19, MODERNA VACCINE 100MCG/0.5ML DOSE: CPT | Performed by: FAMILY MEDICINE

## 2021-03-17 PROCEDURE — 91301 COVID-19, MODERNA VACCINE 100MCG/0.5ML DOSE: CPT | Performed by: FAMILY MEDICINE

## 2021-04-05 RX ORDER — ALBUTEROL SULFATE 90 UG/1
AEROSOL, METERED RESPIRATORY (INHALATION)
Qty: 6.7 G | Refills: 5 | Status: SHIPPED | OUTPATIENT
Start: 2021-04-05 | End: 2021-08-18 | Stop reason: SDUPTHER

## 2021-04-16 RX ORDER — BUDESONIDE AND FORMOTEROL FUMARATE DIHYDRATE 80; 4.5 UG/1; UG/1
AEROSOL RESPIRATORY (INHALATION)
Qty: 10.2 G | Refills: 6 | Status: SHIPPED | OUTPATIENT
Start: 2021-04-16 | End: 2021-08-18 | Stop reason: SDUPTHER

## 2021-08-18 ENCOUNTER — OFFICE VISIT (OUTPATIENT)
Dept: PULMONOLOGY | Age: 71
End: 2021-08-18
Payer: MEDICARE

## 2021-08-18 VITALS
BODY MASS INDEX: 20.99 KG/M2 | WEIGHT: 134 LBS | DIASTOLIC BLOOD PRESSURE: 84 MMHG | HEART RATE: 95 BPM | OXYGEN SATURATION: 96 % | SYSTOLIC BLOOD PRESSURE: 120 MMHG

## 2021-08-18 DIAGNOSIS — Z72.0 TOBACCO ABUSE: Primary | ICD-10-CM

## 2021-08-18 DIAGNOSIS — J44.9 ASTHMA WITH COPD (HCC): ICD-10-CM

## 2021-08-18 PROCEDURE — G8427 DOCREV CUR MEDS BY ELIG CLIN: HCPCS | Performed by: INTERNAL MEDICINE

## 2021-08-18 PROCEDURE — 1123F ACP DISCUSS/DSCN MKR DOCD: CPT | Performed by: INTERNAL MEDICINE

## 2021-08-18 PROCEDURE — 3023F SPIROM DOC REV: CPT | Performed by: INTERNAL MEDICINE

## 2021-08-18 PROCEDURE — 3017F COLORECTAL CA SCREEN DOC REV: CPT | Performed by: INTERNAL MEDICINE

## 2021-08-18 PROCEDURE — 4004F PT TOBACCO SCREEN RCVD TLK: CPT | Performed by: INTERNAL MEDICINE

## 2021-08-18 PROCEDURE — 4040F PNEUMOC VAC/ADMIN/RCVD: CPT | Performed by: INTERNAL MEDICINE

## 2021-08-18 PROCEDURE — 1090F PRES/ABSN URINE INCON ASSESS: CPT | Performed by: INTERNAL MEDICINE

## 2021-08-18 PROCEDURE — G8926 SPIRO NO PERF OR DOC: HCPCS | Performed by: INTERNAL MEDICINE

## 2021-08-18 PROCEDURE — 99213 OFFICE O/P EST LOW 20 MIN: CPT | Performed by: INTERNAL MEDICINE

## 2021-08-18 PROCEDURE — G8420 CALC BMI NORM PARAMETERS: HCPCS | Performed by: INTERNAL MEDICINE

## 2021-08-18 PROCEDURE — G8400 PT W/DXA NO RESULTS DOC: HCPCS | Performed by: INTERNAL MEDICINE

## 2021-08-18 RX ORDER — ALBUTEROL SULFATE 2.5 MG/3ML
2.5 SOLUTION RESPIRATORY (INHALATION) EVERY 6 HOURS PRN
Qty: 120 EACH | Refills: 5 | Status: SHIPPED | OUTPATIENT
Start: 2021-08-18

## 2021-08-18 RX ORDER — ALBUTEROL SULFATE 90 UG/1
AEROSOL, METERED RESPIRATORY (INHALATION)
Qty: 6.7 G | Refills: 5 | Status: SHIPPED | OUTPATIENT
Start: 2021-08-18

## 2021-08-18 RX ORDER — BUDESONIDE AND FORMOTEROL FUMARATE DIHYDRATE 80; 4.5 UG/1; UG/1
AEROSOL RESPIRATORY (INHALATION)
Qty: 10.2 G | Refills: 5 | Status: SHIPPED | OUTPATIENT
Start: 2021-08-18 | End: 2022-09-06 | Stop reason: ALTCHOICE

## 2021-08-18 NOTE — PROGRESS NOTES
cigarettes. She has a 75.00 pack-year smoking history. She has never used smokeless tobacco.  ETOH:   reports current alcohol use. Patient currently lives independently      Family History:       Adopted: Yes   Problem Relation Age of Onset    No Known Problems Mother     No Known Problems Father        Current Medications:    Current Outpatient Medications:     budesonide-formoterol (SYMBICORT) 80-4.5 MCG/ACT AERO, INHALE 2 PUFFS BY MOUTH TWO TIMES A DAY, Disp: 10.2 g, Rfl: 6    albuterol sulfate HFA (PROVENTIL HFA) 108 (90 Base) MCG/ACT inhaler, INHALE 2 PUFFS BY MOUTH EVERY SIX HOURS AS NEEDED, Disp: 6.7 g, Rfl: 5    Progesterone Micronized (PROGESTERONE PO), Take 200 mg by mouth daily, Disp: , Rfl:     Nutritional Supplements (DHEA PO), DHEA  1 PO DAILY, Disp: , Rfl:     thyroid (NATURE-THROID) 65 MG tablet, Take 65 mg by mouth daily, Disp: , Rfl:     UNABLE TO FIND, Estrogen and progesterone pellets, Disp: , Rfl:     Ergocalciferol (VITAMIN D2 PO), Take 1.25 mg by mouth once a week , Disp: , Rfl:     NIFEdipine (PROCARDIA XL) 30 MG extended release tablet, , Disp: , Rfl:     omeprazole (PRILOSEC) 20 MG capsule, Take 40 mg by mouth daily , Disp: , Rfl:     albuterol (PROVENTIL) (2.5 MG/3ML) 0.083% nebulizer solution, Take 2.5 mg by nebulization as needed , Disp: , Rfl:       Objective:   PHYSICAL EXAM:        VITALS:    /84   Pulse 95   Wt 134 lb (60.8 kg)   SpO2 96% Comment: room air  BMI 20.99 kg/m²     Constitutional: In no acute distress. Appears stated age. Comfortable. Eyes: PERRL. No sclera icterus. No conjunctival injection. ENT: No ocular or auricular discharge or visible masses. Oropharynx clear. Neck: Trachea midline. Resp: No accessory muscle use. No crackles. No wheezes. No rhonchi. No dullness on percussion. CTA B  CV: Regular rate. Regular rhythm. No murmur or rub. Normal S1 and S2. No edema. GI: Abdomen non-tender. Non-distended. No masses. Skin: Warm and dry. No nodules on exposed extremities. No rash on exposed extremities. M/S: No cyanosis. No synovitis or joint deformity. No clubbing. Neuro: Cranial nerves are grossly intact. Moving all extremities. Motor and sensation grossly intact. Psych: No anxiety or agitation. DATA:      LABS:      PFTs 11/11/15  FVC 2.96 (89%) FEV1 1.31 (54%) FEV1/FVC ratio  44%  TLC 6.69 (123%) DLCO 16.26 (84%)      IMAGING:      Chest LDCT 1/19/21:  Lungs/Pleura:  Mild underlying emphysema is seen.  Linear and curvilinear  opacities are seen in the apices, compatible with scarring.  Scattered areas  of bronchial wall thickening are seen.  No pneumonia.  No edema.  No pleural  effusion.  Noncalcified pulmonary nodule seen in the right upper lobe  measuring 3 mm.           ASSESSMENT AND PLAN:  Tobacco abuse   - advise smoking cessation again- she is still smoking 1 ppd  - discussed the  recommendations of the USPSTF for Lung Cancer Screening (>30 pack year history) with Low Dose CT annually, including the risks and benefits of both screening and not screening. It is recommended that this patient obtain annual low dose screening CT scans, however, she was agreeable. - next LDCT due in 1/22     Asthma with COPD   - Continue inhaled bronchodilator therapy ( albuterol prn); symbicort 80 qd- bid  - Patient is up to date with Pneumococcal vaccine in 2010    - regular use of twice daily Symbicort- advised salt water gargling  -Recommend over-the-counter antihistaminestamine for the next 1 to 2 months. Informed patient of my impending departure from the practice. I will refer to new pulmonologist for ongoing care- [facility] pulm.

## 2021-12-27 ENCOUNTER — TELEPHONE (OUTPATIENT)
Dept: CASE MANAGEMENT | Age: 71
End: 2021-12-27

## 2021-12-27 NOTE — TELEPHONE ENCOUNTER
Patient due for annual CT Lung Screening. Reminder letter mailed.     Mary Jo Sampson [address] Lung Navigator  [phone]

## 2022-01-02 ENCOUNTER — TELEPHONE (OUTPATIENT)
Dept: CASE MANAGEMENT | Age: 72
End: 2022-01-02

## 2022-01-03 NOTE — TELEPHONE ENCOUNTER
Patient due for annual CT Lung Screening. Reminder letter mailed.     Mary Jo Sampson [address] Lung Navigator  [phone]

## 2022-01-27 ENCOUNTER — HOSPITAL ENCOUNTER (OUTPATIENT)
Dept: CT IMAGING | Age: 72
Discharge: HOME | End: 2022-01-27
Payer: MEDICARE

## 2022-01-27 DIAGNOSIS — Z12.2 SCREENING FOR LUNG CANCER: ICD-10-CM

## 2022-01-27 PROCEDURE — 71271 CT THORAX LUNG CANCER SCR C-: CPT

## 2022-02-16 ENCOUNTER — OFFICE VISIT (OUTPATIENT)
Dept: PULMONOLOGY | Age: 72
End: 2022-02-16
Payer: MEDICARE

## 2022-02-16 VITALS
OXYGEN SATURATION: 96 % | RESPIRATION RATE: 18 BRPM | HEIGHT: 67 IN | SYSTOLIC BLOOD PRESSURE: 150 MMHG | TEMPERATURE: 97.4 F | WEIGHT: 131.8 LBS | HEART RATE: 94 BPM | BODY MASS INDEX: 20.69 KG/M2 | DIASTOLIC BLOOD PRESSURE: 85 MMHG

## 2022-02-16 DIAGNOSIS — Z72.0 TOBACCO ABUSE: ICD-10-CM

## 2022-02-16 DIAGNOSIS — J44.9 ASTHMA WITH COPD (HCC): Primary | ICD-10-CM

## 2022-02-16 DIAGNOSIS — J43.2 CENTRILOBULAR EMPHYSEMA (HCC): ICD-10-CM

## 2022-02-16 DIAGNOSIS — K21.9 GASTROESOPHAGEAL REFLUX DISEASE, UNSPECIFIED WHETHER ESOPHAGITIS PRESENT: ICD-10-CM

## 2022-02-16 DIAGNOSIS — R06.02 SHORTNESS OF BREATH: ICD-10-CM

## 2022-02-16 DIAGNOSIS — I25.10 CORONARY ARTERY CALCIFICATION SEEN ON CT SCAN: ICD-10-CM

## 2022-02-16 PROCEDURE — 3023F SPIROM DOC REV: CPT | Performed by: INTERNAL MEDICINE

## 2022-02-16 PROCEDURE — 1090F PRES/ABSN URINE INCON ASSESS: CPT | Performed by: INTERNAL MEDICINE

## 2022-02-16 PROCEDURE — 3017F COLORECTAL CA SCREEN DOC REV: CPT | Performed by: INTERNAL MEDICINE

## 2022-02-16 PROCEDURE — G8420 CALC BMI NORM PARAMETERS: HCPCS | Performed by: INTERNAL MEDICINE

## 2022-02-16 PROCEDURE — G8400 PT W/DXA NO RESULTS DOC: HCPCS | Performed by: INTERNAL MEDICINE

## 2022-02-16 PROCEDURE — 1123F ACP DISCUSS/DSCN MKR DOCD: CPT | Performed by: INTERNAL MEDICINE

## 2022-02-16 PROCEDURE — 99214 OFFICE O/P EST MOD 30 MIN: CPT | Performed by: INTERNAL MEDICINE

## 2022-02-16 PROCEDURE — G8427 DOCREV CUR MEDS BY ELIG CLIN: HCPCS | Performed by: INTERNAL MEDICINE

## 2022-02-16 PROCEDURE — 4004F PT TOBACCO SCREEN RCVD TLK: CPT | Performed by: INTERNAL MEDICINE

## 2022-02-16 PROCEDURE — G8484 FLU IMMUNIZE NO ADMIN: HCPCS | Performed by: INTERNAL MEDICINE

## 2022-02-16 PROCEDURE — 4040F PNEUMOC VAC/ADMIN/RCVD: CPT | Performed by: INTERNAL MEDICINE

## 2022-02-16 RX ORDER — BUDESONIDE, GLYCOPYRROLATE, AND FORMOTEROL FUMARATE 160; 9; 4.8 UG/1; UG/1; UG/1
2 AEROSOL, METERED RESPIRATORY (INHALATION) 2 TIMES DAILY
Qty: 2 EACH | Refills: 0
Start: 2022-02-16 | End: 2022-10-18

## 2022-02-16 RX ORDER — ATORVASTATIN CALCIUM 20 MG/1
TABLET, FILM COATED ORAL
COMMUNITY
Start: 2022-01-17

## 2022-02-16 ASSESSMENT — SLEEP AND FATIGUE QUESTIONNAIRES
HOW LIKELY ARE YOU TO NOD OFF OR FALL ASLEEP WHILE SITTING QUIETLY AFTER LUNCH WITHOUT ALCOHOL: 0
HOW LIKELY ARE YOU TO NOD OFF OR FALL ASLEEP WHILE SITTING AND READING: 0
HOW LIKELY ARE YOU TO NOD OFF OR FALL ASLEEP WHEN YOU ARE A PASSENGER IN A CAR FOR AN HOUR WITHOUT A BREAK: 0
HOW LIKELY ARE YOU TO NOD OFF OR FALL ASLEEP WHILE LYING DOWN TO REST IN THE AFTERNOON WHEN CIRCUMSTANCES PERMIT: 0
HOW LIKELY ARE YOU TO NOD OFF OR FALL ASLEEP WHILE WATCHING TV: 0
HOW LIKELY ARE YOU TO NOD OFF OR FALL ASLEEP WHILE SITTING INACTIVE IN A PUBLIC PLACE: 0
ESS TOTAL SCORE: 0
HOW LIKELY ARE YOU TO NOD OFF OR FALL ASLEEP WHILE SITTING AND TALKING TO SOMEONE: 0
HOW LIKELY ARE YOU TO NOD OFF OR FALL ASLEEP IN A CAR, WHILE STOPPED FOR A FEW MINUTES IN TRAFFIC: 0
NECK CIRCUMFERENCE (INCHES): 13.5

## 2022-02-16 NOTE — PROGRESS NOTES
MA Communication:   The following orders are received by verbal communication from Taryn Thakur MD    Orders include:    F/u on 6 mon on 8/9/22

## 2022-02-16 NOTE — PATIENT INSTRUCTIONS
Remember to bring a list of pulmonary medications and any CPAP or BiPAP machines to your next appointment with the office. Please keep all of your future appointments scheduled by [address], [facility] office. Out of respect for other patients and providers, you may be asked to reschedule your appointment if you arrive later than your scheduled appointment time. Appointments cancelled less than 24hrs in advance will be considered a no show. Patients with three missed appointments within 1 year or four missed appointments within 2 years can be dismissed from the practice. Please be aware that our physicians are required to work in the Intensive Care Unit at [facility].  Your appointment may need to be rescheduled if they are designated to work during your appointment time. You may receive a survey regarding the care you received during your visit. Your input is valuable to us. We encourage you to complete and return your survey. We hope you will choose us in the future for your healthcare needs. Pt instructed of all future appointment dates & times, including radiology, labs, procedures & referrals. If procedures were scheduled preparation instructions provided. Instructions on future appointments with [facility] were given.

## 2022-02-16 NOTE — PROGRESS NOTES
C/O Pulmonary evaluation and to establish to this practice      HPI: Patient is a 67-year-old female who has come to the office for a pulmonary evaluation, patient used to see Dr. Abbey Rock at 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, patient does also have some dysphagia at times, patient has been taking Prilosec on a regular basis for reflux symptoms, patient states that she has lost some weight by choice, patient also states that she does not have any epistaxis or hemoptysis, patient does not have a significant pleuritic chest pain, no fever no chills, patient states that she has had some swelling of the right leg after she fell off of ladder, patient continues to be a smoker she smokes less than 1 pack a day, patient has multiple pets including horses cat and parents at home, patient has a lot of dust in her barn, no mold, patient states that she was told to take Symbicort by Dr. Abbey Rock but patient states it her insurance wants her to spend there and $385 on that and patient has not been taking that and instead patient states she has been using nebulizer treatment and she has to use it 3-4 times a day along with that patient takes albuterol inhaler to try a day, patient states that there has not been a change in the ambient environment, patient states that 2 months back she was started on cholesterol medications, patient does not have any confusion lethargy, no other pertinent review of system of concern              Review of Systems same as above     Physical Exam:  Blood pressure (!) 150/85, pulse 94, temperature 97.4 °F (36.3 °C), temperature source Temporal, resp. rate 18, height 5' 7\" (1.702 m), weight 131 lb 12.8 oz (59.8 kg), SpO2 96 %.'  Constitutional:  No acute distress. HENT:  Oropharynx is clear and moist. Nothyromegaly. Patient has some discoloration of the forehead which has slightly become bigger as compared to the previous time as per the patient but is being followed by rheumatology  Eyes:  Conjunctivae are normal. Pupils equal, round, and reactive to light. No scleral icterus. Neck: . No tracheal deviation present. No obviousthyroid mass. Cardiovascular: Normal rate, regular rhythm, normal heart sounds. No right ventricular heave. No lower extremity edema. Pulmonary/Chest: No wheezes. No rales. Chest wall is not dull to percussion. No accessory muscle usage or stridor. Abdominal: Soft. Bowel sounds present. No distension or hernia. No tenderness. Musculoskeletal : No cyanosis. No clubbing. No obvious joint deformity. Lymphadenopathy: No cervical or supraclavicularadenopathy. Skin: Skin is warm and dry. No rash or nodules on the exposed extremities. Psychiatric: Normal mood and affect. Behavior is normal.  No anxiety. Neurologic : Alert, awake and oriented. PERRL. Speechfluent      Sleep Medicine Data:  Sitting and reading: Would never doze  Watching TV: Would never doze  Sitting, inactive in a public place (e.g. a theatre or a meeting):  Would never doze  As a passenger in a car for an hour without a break: Would never doze  Lying down to rest in the afternoon when circumstances permit: Would never doze  Sitting and talking to someone: Would never doze  Sitting quietly after a lunch without alcohol: Would never doze  In a car, while stopped for a few minutes in traffic: Would never doze  Total score: 0  Neck circumference (Inches): 13.5        Data:     Imaging:  I have reviewed radiology images personally. No orders to display     CT lung screen [Initial/Annual]    Addendum Date: 2022    ADDENDUM: Category 2, Benign appearance or behavior. Management:  Continue annual lung screening with LDCT in 12 months. Result Date: 2022  EXAMINATION: LOW DOSE SCREENING CT OF THE CHEST WITHOUT CONTRAST 2022 1:58 pm TECHNIQUE: Low dose lung cancer screening CT of the chest was performed without the administration of intravenous contrast.  Multiplanar reformatted images are provided for review. Dose modulation, iterative reconstruction, and/or weight based adjustment of the mA/kV was utilized to reduce the radiation dose to as low as reasonably achievable. COMPARISON:  HISTORY: ORDERING SYSTEM PROVIDED HISTORY: Screening for lung cancer TECHNOLOGIST PROVIDED HISTORY: Age: 80 y.o. Smoking History: Social History Tobacco Use Smoking status: Current Every Day Smoker Packs/day: 1.50 Years: 50.00 Pack years: 76 Types: Cigarettes Quit date: 2015 Years since quittin.9 Smokeless tobacco: Never Used Vaping Use Vaping Use: Never used Alcohol use: Yes Alcohol/week: 0.0 standard drinks Comment: occassional Drug use: No Pack years: 76 Last CT lung screen: 2021 Is there documentation of shared decision making? ->Yes Does the patient show any signs or symptoms of lung cancer? ->No Is this the first (baseline) CT or an annual exam?->Annual Is this a low dose CT or a routine CT?->Low Dose CT Smoking Status?->Current Every Day Smoker Smoking packs per day?->1.5 Years smoking?->50 Reason for Exam: smoking, current smoker CTDlvol (mGy)->1.12 FINDINGS: Mediastinum:  Thyroid gland is unremarkable. Atherosclerotic change seen in aorta. Moderate coronary artery calcification is seen. No pericardial effusion is seen. Small hiatal hernia seen.   There is nonspecific thickening at the GE junction Lungs/Pleura: Respiratory motion artifact limits evaluation of fine pulmonary parenchymal change. A few areas of bronchial wall thickening are seen. No pneumonia. No edema. No pleural effusion noted. Mild underlying emphysema is seen. No large blebs or bulla. 3 mm nodule right upper lobe is unchanged Upper Abdomen:  Right adrenal gland is normal.  Left adrenal gland is normal. Atherosclerotic change seen in abdominal aorta. Soft Tissues/Bones: Spurring is seen in the spine. Spurring is seen in the shoulder joints     Stable CT. There is underlying emphysema and stable tiny pulmonary nodule on the right. Moderate coronary artery calcification LUNG RADS: Per ACR Lung-RADS Version 1.1 RECOMMENDATIONS: Unavailable     Patient's PFT from 2015 shows patient to have moderate obstructive airway disease with FEV1 of 54%    Assessment:    1. Asthma with COPD ([address]      2. Shortness of breath      3. Tobacco abuse      4. Gastroesophageal reflux disease, unspecified whether esophagitis present      5. Coronary artery calcification seen on CT scan      6.  Centrilobular emphysema ([address]          Plan:   · Patient's review of system were discussed  · Patient was told about the clinical finding including auscultation and implications  · Patient was shown the CT of the chest along with findings/implications along with interpretation and options  · Patient's PFT from 2015 was reviewed  · Patient was told about the pathophysiology of the disease process and its modifying factors  · Patient was told that it is imperative for her to stop smoking otherwise she will have increasing morbidity and mortality and can be respiratory cripple- will contemplate  · Patient has been using short-acting inhalers and nebulizer 4-6 times a day and the fallacy of that discussed  · Patient was given a sample of Breztri inhaler and was told to take 2 puffs twice a day and to rinse mouth with water after use and see if that makes a difference or not  · Patient has not been using the Symbicort at this time  · Patient was told to use albuterol inhaler or nebulizer only when needed  · Patient was told about the side effects of too much of beta agonists in detail  · Patient does have some coronary artery calcification and patient is following cardiology for that and patient has been tried on statin in the recent past  · Diet and lifestyle modification discussed  · Patient to continue with PPI as given by PCP  · Smoking cessation reinforced  · Patient was told to call the office in 3 to 4 weeks time to see if the new inhaler is helping her more or not and if so then can reassess about medications to be sent for prescription to her pharmacy as covered by insurance  · Steam stimulation will help  · A humidifier in the bedroom will help  · A HEPA filter at home to help  · Patient further management depending on patient's clinical status and the follow-up on above recommendations

## 2022-02-20 ENCOUNTER — TELEPHONE (OUTPATIENT)
Dept: CASE MANAGEMENT | Age: 72
End: 2022-02-20

## 2022-02-20 NOTE — TELEPHONE ENCOUNTER
Annual Lung Cancer Screening CT on 1/29/2022. LRAD2. Recommended screen in one year. Results letter mailed.     Thank you,  Kareen Suarez Lung Navigator  [phone]

## 2022-08-24 ENCOUNTER — OFFICE VISIT (OUTPATIENT)
Dept: ENT CLINIC | Age: 72
End: 2022-08-24
Payer: MEDICARE

## 2022-08-24 VITALS
WEIGHT: 131 LBS | HEIGHT: 67 IN | DIASTOLIC BLOOD PRESSURE: 84 MMHG | BODY MASS INDEX: 20.56 KG/M2 | SYSTOLIC BLOOD PRESSURE: 144 MMHG | TEMPERATURE: 97.2 F | HEART RATE: 95 BPM

## 2022-08-24 DIAGNOSIS — R13.10 DYSPHAGIA, UNSPECIFIED TYPE: Primary | ICD-10-CM

## 2022-08-24 DIAGNOSIS — F17.200 SMOKING: ICD-10-CM

## 2022-08-24 DIAGNOSIS — R07.0 THROAT PAIN: ICD-10-CM

## 2022-08-24 PROCEDURE — 3017F COLORECTAL CA SCREEN DOC REV: CPT | Performed by: OTOLARYNGOLOGY

## 2022-08-24 PROCEDURE — G8427 DOCREV CUR MEDS BY ELIG CLIN: HCPCS | Performed by: OTOLARYNGOLOGY

## 2022-08-24 PROCEDURE — G8400 PT W/DXA NO RESULTS DOC: HCPCS | Performed by: OTOLARYNGOLOGY

## 2022-08-24 PROCEDURE — 1123F ACP DISCUSS/DSCN MKR DOCD: CPT | Performed by: OTOLARYNGOLOGY

## 2022-08-24 PROCEDURE — 1090F PRES/ABSN URINE INCON ASSESS: CPT | Performed by: OTOLARYNGOLOGY

## 2022-08-24 PROCEDURE — 4004F PT TOBACCO SCREEN RCVD TLK: CPT | Performed by: OTOLARYNGOLOGY

## 2022-08-24 PROCEDURE — G8420 CALC BMI NORM PARAMETERS: HCPCS | Performed by: OTOLARYNGOLOGY

## 2022-08-24 PROCEDURE — 99203 OFFICE O/P NEW LOW 30 MIN: CPT | Performed by: OTOLARYNGOLOGY

## 2022-08-24 PROCEDURE — 31575 DIAGNOSTIC LARYNGOSCOPY: CPT | Performed by: OTOLARYNGOLOGY

## 2022-08-24 ASSESSMENT — ENCOUNTER SYMPTOMS
APNEA: 0
TROUBLE SWALLOWING: 1
SORE THROAT: 1
SHORTNESS OF BREATH: 0
COUGH: 0
FACIAL SWELLING: 0
SINUS PRESSURE: 0
VOICE CHANGE: 0
EYE ITCHING: 0

## 2022-08-24 NOTE — PROGRESS NOTES
[facility] [address], [address]  [place], [place]  P: [phone]       Patient     Gus Ramos  1950    ChiefComplaint     Chief Complaint   Patient presents with    Dysphagia     Patient is here today for dysphagia. Patient states she has been having trouble swallowing for a month now. Patient states she has a sore throat, swollen gland on left, and it hurts to swallow. Patient states she is coughing, and blowing out white mucous. History of Present Illness     Channing Milian is a 58-year-old female here today for evaluation of left-sided sore throat, intermittent swollen lymph node and dysphagia. States family member was sick 1 month ago she became sick family member improved but she is persisted with symptoms. Has been on 3 courses of antibiotics without improvement in symptoms. History significant for Goldman's esophagitis last scoped approximately 8 to 10 years ago by Dr. Renetta Shetty. Current daily smoker. Takes Prilosec 20 mg.     Past Medical History     Past Medical History:   Diagnosis Date    Asthma     COPD (chronic obstructive pulmonary disease) ([address] 2015    mild    Coronary artery calcification seen on CT scan 2015    Degeneration of cervical intervertebral disc 9/13/2016    Heart attack [address] [address]     Hypertension     Pneumonia        Past Surgical History     Past Surgical History:   Procedure Laterality Date    APPENDECTOMY      DIAGNOSTIC CARDIAC CATH LAB PROCEDURE  10/15    OTHER SURGICAL HISTORY N/A 04/07/2017    POSTERIOR CERVICAL LAMINOFORAMINOTOMY C3/4, C4/5, AND C5/6    SHOULDER SURGERY      SKIN GRAFT      TONSILLECTOMY         Family History     Family History   Adopted: Yes   Problem Relation Age of Onset    No Known Problems Mother     No Known Problems Father        Social History     Social History     Tobacco Use    Smoking status: Every Day     Packs/day: 1.50     Years: 50.00     Pack years: 75.00     Types: Cigarettes     Start date: 4/23/1966 Smokeless tobacco: Never    Tobacco comments:     quite for 15 years and started again in 2020   Vaping Use    Vaping Use: Never used   Substance Use Topics    Alcohol use: Yes     Alcohol/week: 0.0 standard drinks     Comment: occassional    Drug use: No        Allergies     No Known Allergies    Medications     Current Outpatient Medications   Medication Sig Dispense Refill    atorvastatin (LIPITOR) 20 MG tablet       Budeson-Glycopyrrol-Formoterol (BREZTRI AEROSPHERE) 160-9-4.8 MCG/ACT AERO Inhale 2 puffs into the lungs 2 times daily Pt was given 2 samples of Breztri    Lot [id number]  Exp 7/23 2 each 0    budesonide-formoterol (SYMBICORT) 80-4.5 MCG/ACT AERO INHALE 2 PUFFS BY MOUTH TWO TIMES A DAY 10.2 g 5    albuterol sulfate HFA (PROVENTIL HFA) 108 (90 Base) MCG/ACT inhaler INHALE 2 PUFFS BY MOUTH EVERY SIX HOURS AS NEEDED 6.7 g 5    albuterol (PROVENTIL) (2.5 MG/3ML) 0.083% nebulizer solution Take 3 mLs by nebulization every 6 hours as needed for Wheezing or Shortness of Breath 120 each 5    thyroid (ARMOUR) 65 MG tablet Take 65 mg by mouth daily      UNABLE TO FIND Estrogen and progesterone pellets      Ergocalciferol (VITAMIN D2 PO) Take 1.25 mg by mouth once a week       NIFEdipine (PROCARDIA XL) 30 MG extended release tablet       omeprazole (PRILOSEC) 20 MG capsule Take 40 mg by mouth daily        No current facility-administered medications for this visit. Review of Systems     Review of Systems   Constitutional:  Negative for appetite change, chills, fatigue, fever and unexpected weight change. HENT:  Positive for sore throat and trouble swallowing. Negative for congestion, ear discharge, ear pain, facial swelling, hearing loss, nosebleeds, postnasal drip, sinus pressure, sneezing, tinnitus and voice change. Eyes:  Negative for itching. Respiratory:  Negative for apnea, cough and shortness of breath. Endocrine: Negative for cold intolerance and heat intolerance.    Musculoskeletal: Negative for myalgias and neck pain. Skin:  Negative for rash. Allergic/Immunologic: Negative for environmental allergies. Neurological:  Negative for dizziness and headaches. Psychiatric/Behavioral:  Negative for confusion, decreased concentration and sleep disturbance. PhysicalExam     Vitals:    08/24/22 1333   BP: (!) 144/84   Site: Left Upper Arm   Position: Sitting   Pulse: 95   Temp: 97.2 °F (36.2 °C)   TempSrc: Infrared   Weight: 131 lb (59.4 kg)   Height: 5' 7\" (1.702 m)       Physical Exam  Constitutional:       General: She is not in acute distress. Appearance: She is well-developed. HENT:      Head: Normocephalic and atraumatic. Right Ear: Tympanic membrane, ear canal and external ear normal. No drainage. No middle ear effusion. Tympanic membrane is not bulging. Tympanic membrane has normal mobility. Left Ear: Tympanic membrane, ear canal and external ear normal. No drainage. No middle ear effusion. Tympanic membrane is not bulging. Tympanic membrane has normal mobility. Nose: No mucosal edema or rhinorrhea. Mouth/Throat:      Lips: Pink. Mouth: Mucous membranes are moist.      Tongue: No lesions. Palate: No mass. Pharynx: Uvula midline. Eyes:      Pupils: Pupils are equal, round, and reactive to light. Neck:      Thyroid: No thyroid mass or thyromegaly. Trachea: Trachea and phonation normal.   Cardiovascular:      Pulses: Normal pulses. Pulmonary:      Effort: Pulmonary effort is normal. No accessory muscle usage or respiratory distress. Breath sounds: No stridor. Musculoskeletal:      Cervical back: Full passive range of motion without pain. Lymphadenopathy:      Head:      Right side of head: No submental or submandibular adenopathy. Left side of head: No submental or submandibular adenopathy. Cervical: No cervical adenopathy. Right cervical: No superficial, deep or posterior cervical adenopathy.      Left does not identify source she will call and we will proceed with CT scan at that time. Oma Milian, DO  8/24/22      Portions of this note were dictated using Dragon.  There may be linguistic errors secondary to the use of this program.

## 2022-09-06 ENCOUNTER — OFFICE VISIT (OUTPATIENT)
Dept: PULMONOLOGY | Age: 72
End: 2022-09-06
Payer: MEDICARE

## 2022-09-06 VITALS
SYSTOLIC BLOOD PRESSURE: 149 MMHG | DIASTOLIC BLOOD PRESSURE: 81 MMHG | BODY MASS INDEX: 21.28 KG/M2 | HEIGHT: 67 IN | WEIGHT: 135.6 LBS | TEMPERATURE: 98.6 F | RESPIRATION RATE: 16 BRPM | OXYGEN SATURATION: 97 % | HEART RATE: 76 BPM

## 2022-09-06 DIAGNOSIS — Z72.0 TOBACCO ABUSE: ICD-10-CM

## 2022-09-06 DIAGNOSIS — J43.2 CENTRILOBULAR EMPHYSEMA (HCC): Primary | ICD-10-CM

## 2022-09-06 DIAGNOSIS — J44.9 ASTHMA WITH COPD (HCC): ICD-10-CM

## 2022-09-06 DIAGNOSIS — R06.02 SHORTNESS OF BREATH: ICD-10-CM

## 2022-09-06 DIAGNOSIS — K21.9 GASTROESOPHAGEAL REFLUX DISEASE, UNSPECIFIED WHETHER ESOPHAGITIS PRESENT: ICD-10-CM

## 2022-09-06 PROCEDURE — 99213 OFFICE O/P EST LOW 20 MIN: CPT | Performed by: INTERNAL MEDICINE

## 2022-09-06 PROCEDURE — G8427 DOCREV CUR MEDS BY ELIG CLIN: HCPCS | Performed by: INTERNAL MEDICINE

## 2022-09-06 PROCEDURE — 3023F SPIROM DOC REV: CPT | Performed by: INTERNAL MEDICINE

## 2022-09-06 PROCEDURE — 4004F PT TOBACCO SCREEN RCVD TLK: CPT | Performed by: INTERNAL MEDICINE

## 2022-09-06 PROCEDURE — 3017F COLORECTAL CA SCREEN DOC REV: CPT | Performed by: INTERNAL MEDICINE

## 2022-09-06 PROCEDURE — 1123F ACP DISCUSS/DSCN MKR DOCD: CPT | Performed by: INTERNAL MEDICINE

## 2022-09-06 PROCEDURE — G8400 PT W/DXA NO RESULTS DOC: HCPCS | Performed by: INTERNAL MEDICINE

## 2022-09-06 PROCEDURE — G8420 CALC BMI NORM PARAMETERS: HCPCS | Performed by: INTERNAL MEDICINE

## 2022-09-06 PROCEDURE — 1090F PRES/ABSN URINE INCON ASSESS: CPT | Performed by: INTERNAL MEDICINE

## 2022-09-06 RX ORDER — BUDESONIDE, GLYCOPYRROLATE, AND FORMOTEROL FUMARATE 160; 9; 4.8 UG/1; UG/1; UG/1
2 AEROSOL, METERED RESPIRATORY (INHALATION) 2 TIMES DAILY
Qty: 4 EACH | Refills: 0
Start: 2022-09-06

## 2022-09-06 NOTE — PROGRESS NOTES
C/O Pulmonary evaluation and to discuss the clinical status       Patient is a 72-year-old female who has come to the office for a pulmonary follow-up and to discuss the clinical status and options, patient states that her shortness of breath comes and goes, patient states that the [facility] inhaler was helping her but is not covered by insurance and patient states that she cannot afford the copayment, patient states that she also was having some nasal congestion sore throat and patient states that she had swollen gland glands and patient had 4 rounds of antibiotics in the recent past without any significant improvement, patient was recently seen by ENT and patient's Prilosec has been doubled, patient states that she was also told to follow-up with GI in the near future, patient states that she does have some phlegm which is thick and white, patient states that she off-and-on has wheezing, no fever no chills, no epistaxis or hemoptysis, patient does not have any significant palpitations or diaphoresis, patient continues to smoke patient does have reflux symptoms, patient does not have any increasing leg edema, patient does not have any confusion lethargy, no other pertinent review of system of concern     Previous HPI: Patient is a 72-year-old female who has come to the office for a pulmonary evaluation, patient used to see Dr. Estee Giles at [facility] pulmonology prior to this, patient states that she has cough expectoration along with shortness of breath, patient states that in the morning she has thick white secretions along with that patient does have shortness of breath when she is going up and down the stairs, patient states that she also has had some chest pain and patient was recently evaluated by cardiology and was told that on the basis of the previous evaluation she may have had a silent MI and patient is supposed to follow-up with a cardiologist soon, patient states that she has she has had Goldman's esophagus, patient does also have some dysphagia at times, patient has been taking Prilosec on a regular basis for reflux symptoms, patient states that she has lost some weight by choice, patient also states that she does not have any epistaxis or hemoptysis, patient does not have a significant pleuritic chest pain, no fever no chills, patient states that she has had some swelling of the right leg after she fell off of ladder, patient continues to be a smoker she smokes less than 1 pack a day, patient has multiple pets including horses cat and parents at home, patient has a lot of dust in her barn, no mold, patient states that she was told to take Symbicort by Dr. Lavern Elkins but patient states it her insurance wants her to spend there and $385 on that and patient has not been taking that and instead patient states she has been using nebulizer treatment and she has to use it 3-4 times a day along with that patient takes albuterol inhaler to try a day, patient states that there has not been a change in the ambient environment, patient states that 2 months back she was started on cholesterol medications, patient does not have any confusion lethargy, no other pertinent review of system of concern              Review of Systems same as above     Physical Exam:  Height 5' 7\" (1.702 m), weight 135 lb 9.6 oz (61.5 kg). '  Constitutional:  No acute distress. HENT:  Oropharynx is clear and moist. Nothyromegaly. Patient has some discoloration of the forehead which has slightly become bigger as compared to the previous time as per the patient but is being followed by rheumatology  Eyes:  Conjunctivae are normal. Pupils equal, round, and reactive to light. No scleral icterus. Neck: . No tracheal deviation present. No obviousthyroid mass. Cardiovascular: Normal rate, regular rhythm, normal heart sounds. No right ventricular heave. No lower extremity edema. Pulmonary/Chest: No wheezes. No rales.   Chest wall is not dull to percussion. No accessory muscle usage or stridor. Abdominal: Soft. Bowel sounds present. No distension or hernia. No tenderness. Musculoskeletal : No cyanosis. No clubbing. No obvious joint deformity. Lymphadenopathy: No cervical or supraclavicularadenopathy. Skin: Skin is warm and dry. No rash or nodules on the exposed extremities. Psychiatric: Normal mood and affect. Behavior is normal.  No anxiety. Neurologic : Alert, awake and oriented. PERRL. Speechfluent        Data:     Imaging:  I have reviewed radiology images personally. No orders to display     CT lung screen [Initial/Annual]    Addendum Date: 2022    ADDENDUM: Category 2, Benign appearance or behavior. Management:  Continue annual lung screening with LDCT in 12 months. Result Date: 2022  EXAMINATION: LOW DOSE SCREENING CT OF THE CHEST WITHOUT CONTRAST 2022 1:58 pm TECHNIQUE: Low dose lung cancer screening CT of the chest was performed without the administration of intravenous contrast.  Multiplanar reformatted images are provided for review. Dose modulation, iterative reconstruction, and/or weight based adjustment of the mA/kV was utilized to reduce the radiation dose to as low as reasonably achievable. COMPARISON:  HISTORY: ORDERING SYSTEM PROVIDED HISTORY: Screening for lung cancer TECHNOLOGIST PROVIDED HISTORY: Age: 80 y.o. Smoking History: Social History Tobacco Use Smoking status: Current Every Day Smoker Packs/day: 1.50 Years: 50.00 Pack years: 76 Types: Cigarettes Quit date: 2015 Years since quittin.9 Smokeless tobacco: Never Used Vaping Use Vaping Use: Never used Alcohol use: Yes Alcohol/week: 0.0 standard drinks Comment: occassional Drug use: No Pack years: 76 Last CT lung screen: 2021 Is there documentation of shared decision making? ->Yes Does the patient show any signs or symptoms of lung cancer? ->No Is this the first (baseline) CT or an annual exam?->Annual Is this a low dose CT or a routine CT?->Low Dose CT Smoking Status?->Current Every Day Smoker Smoking packs per day?->1.5 Years smoking?->50 Reason for Exam: smoking, current smoker CTDlvol (mGy)->1.12 FINDINGS: Mediastinum:  Thyroid gland is unremarkable. Atherosclerotic change seen in aorta. Moderate coronary artery calcification is seen. No pericardial effusion is seen. Small hiatal hernia seen. There is nonspecific thickening at the GE junction Lungs/Pleura:  Respiratory motion artifact limits evaluation of fine pulmonary parenchymal change. A few areas of bronchial wall thickening are seen. No pneumonia. No edema. No pleural effusion noted. Mild underlying emphysema is seen. No large blebs or bulla. 3 mm nodule right upper lobe is unchanged Upper Abdomen:  Right adrenal gland is normal.  Left adrenal gland is normal. Atherosclerotic change seen in abdominal aorta. Soft Tissues/Bones: Spurring is seen in the spine. Spurring is seen in the shoulder joints     Stable CT. There is underlying emphysema and stable tiny pulmonary nodule on the right. Moderate coronary artery calcification LUNG RADS: Per ACR Lung-RADS Version 1.1 RECOMMENDATIONS: Unavailable     Patient's PFT from 2015 shows patient to have moderate obstructive airway disease with FEV1 of 54%    Assessment:    1. Asthma with COPD ([address]      2. Shortness of breath      3. Tobacco abuse      4. Gastroesophageal reflux disease, unspecified whether esophagitis present      5. Coronary artery calcification seen on CT scan      6.  Centrilobular emphysema ([address]          Plan:   Patient's review of system were discussed  Patient was told about the clinical finding including auscultation and implications  Patient has been shown the CT of the chest along with findings/implications along with interpretation and options  Patient's PFT from 2015 was reviewed  Patient was told about the pathophysiology of the disease process and its modifying factors  Patient was told that it is imperative for her to stop smoking otherwise she will have increasing morbidity and mortality and can be respiratory cripple- will contemplate but not ready for commitment  Patient to continue with Breztri inhaler 2 puffs twice a day and to rinse mouth with water after use otherwise he can have hoarseness of voice of oral thrush  Patient was given some samples of Breztri inhaler from the office  Also patient to be given a patient assistant form for [employer] inhaler  Patient was told to use albuterol inhaler or nebulizer only when needed  No need for any antibiotics or steroids from pulmonary standpoint review at this time  Patient does have some coronary artery calcification and patient is following cardiology for that and patient has been tried on statin in the recent past  Diet and lifestyle modification discussed  Patient to continue with PPI as given by PCP  Smoking cessation reinforced  Low-dose CT of the chest and end of January of next year  Steam stimulation will help  A humidifier in the bedroom will help  A HEPA filter at home to help  Patient to get a flu shot in end of September onwards  Patient further management depending on patient's clinical status and the follow-up on above recommendations and follow-up low-dose CT of the chest

## 2022-09-06 NOTE — PATIENT INSTRUCTIONS
Remember to bring a list of pulmonary medications and any CPAP or BiPAP machines to your next appointment with the office. Please keep all of your future appointments scheduled by [address], [facility] Pulmonary office. Out of respect for other patients and providers, you may be asked to reschedule your appointment if you arrive later than your scheduled appointment time. Appointments cancelled less than 24hrs in advance will be considered a no show. Patients with three missed appointments within 1 year or four missed appointments within 2 years can be dismissed from the practice. Please be aware that our physicians are required to work in the Intensive Care Unit at [facility].  Your appointment may need to be rescheduled if they are designated to work during your appointment time. You may receive a survey regarding the care you received during your visit. Your input is valuable to us. We encourage you to complete and return your survey. We hope you will choose us in the future for your healthcare needs. Pt instructed of all future appointment dates & times, including radiology, labs, procedures & referrals. If procedures were scheduled preparation instructions provided. Instructions on future appointments with [facility] Pulmonary were given.

## 2022-09-06 NOTE — PROGRESS NOTES
MA Communication:   The following orders are received by verbal communication from Tomasa Valentin MD    Orders include:    6 month follow up   LDCT

## 2022-10-18 RX ORDER — SPIRONOLACTONE 25 MG/1
25 TABLET ORAL DAILY
COMMUNITY

## 2022-10-18 NOTE — PROGRESS NOTES
PAT completed with patient orders placed per MD, patient states her granddaughter Carlos will be her  and caregiver after procedure. Sage Oquendo RN

## 2022-10-18 NOTE — PROGRESS NOTES
..1.  Do not eat or drink anything after 12 midnight prior to surgery. This includes no water, chewing gum or mints, except for bowel prep complete per MD.  2.  Take the following pills with a small sip of water on the morning of surgery 10/24/2022.  3.  Aspirin, Ibuprofen, Advil, Naproxen, Vitamin E and other Anti-inflammatory products should be stopped for 5 days before surgery or as directed by your physician. 4.  Check with your doctor regarding stopping Plavix, Coumadin, Lovenox, Fragmin or other blood thinners. 5.  Do not smoke and do not drink alcoholic beverages 24 hours prior to surgery. This includes NA Beer. 6.  You may brush your teeth and gargle the morning of surgery. DO NOT SWALLOW WATER.  7.  You MUST make arrangements for a responsible adult to take you home after your surgery. You will not be allowed to leave alone or drive yourself home. It is strongly suggested someone stay with you the first 24 hours. Your surgery will be cancelled if you do not have a ride home. 8.  A parent/legal guardian must accompany a child scheduled for surgery and plan to stay at the hospital until the child is discharged. Please do not bring other children with you. 9.  Please wear simple, loose fitting clothing to the hospital.  Dominick Whitt not bring valuables ( money, credit cards, checkbooks, etc.)  Do not wear any makeup (including no eye makeup) or nail polish on your fingers or toes. 10.  Do not wear any jewelry or piercing on the day of surgery. All body piercing jewelry must be removed. 11.  If you have dentures, they will be removed before going to the OR; we will provide you a container. If you wear contact lenses or glasses, they will be removed; please bring a case for them.   15.  Notify your Surgeon if you develop any illness between now and surgery time; cough, cold, fever, sore throat, nausea, vomiting, etc.  Please notify your surgeon if you experience dizziness, shortness of breath or blurred vision between now and the time of your surgery. To provide excellent care, visitors will be limited to two in a room at any given time. Please no children under the age of 15 in the surgical department.

## 2022-10-22 ENCOUNTER — ANESTHESIA EVENT (OUTPATIENT)
Dept: ENDOSCOPY | Age: 72
End: 2022-10-22
Payer: MEDICARE

## 2022-10-22 ASSESSMENT — ENCOUNTER SYMPTOMS: SHORTNESS OF BREATH: 1

## 2022-10-22 NOTE — ANESTHESIA PRE PROCEDURE
Department of Anesthesiology  Preprocedure Note       Name:  Olena Catching   Age:  71 y.o.  :  1950                                          MRN:  [id number]         Date:  10/22/2022      Surgeon: Camilla Glass):  Sara Coker DO    Procedure: Procedure(s):  EGD W/ANES. (11:00)  COLON W/ANES. Medications prior to admission:   Prior to Admission medications    Medication Sig Start Date End Date Taking? Authorizing Provider   spironolactone (ALDACTONE) 25 MG tablet Take 25 mg by mouth daily   Yes Historical Provider, MD   Budeson-Glycopyrrol-Formoterol (BREZTRI AEROSPHERE) 160-9-4.8 MCG/ACT AERO Inhale 2 puffs into the lungs 2 times daily 22   Robin Sanders MD   atorvastatin (LIPITOR) 20 MG tablet  22   Historical Provider, MD   albuterol sulfate HFA (PROVENTIL HFA) 108 (90 Base) MCG/ACT inhaler INHALE 2 PUFFS BY MOUTH EVERY SIX HOURS AS NEEDED 21   Vikash Greenfield MD   albuterol (PROVENTIL) (2.5 MG/3ML) 0.083% nebulizer solution Take 3 mLs by nebulization every 6 hours as needed for Wheezing or Shortness of Breath 21   Vikash Greenfield MD   Ergocalciferol (VITAMIN D2 PO) Take 1.25 mg by mouth once a week     Historical Provider, MD   omeprazole (PRILOSEC) 20 MG capsule Take 40 mg by mouth daily     Historical Provider, MD       Current medications:    No current facility-administered medications for this encounter.      Current Outpatient Medications   Medication Sig Dispense Refill    spironolactone (ALDACTONE) 25 MG tablet Take 25 mg by mouth daily      Budeson-Glycopyrrol-Formoterol (BREZTRI AEROSPHERE) 160-9-4.8 MCG/ACT AERO Inhale 2 puffs into the lungs 2 times daily 4 each 0    atorvastatin (LIPITOR) 20 MG tablet       albuterol sulfate HFA (PROVENTIL HFA) 108 (90 Base) MCG/ACT inhaler INHALE 2 PUFFS BY MOUTH EVERY SIX HOURS AS NEEDED 6.7 g 5    albuterol (PROVENTIL) (2.5 MG/3ML) 0.083% nebulizer solution Take 3 mLs by nebulization every 6 hours as needed for Wheezing or Shortness of Breath 120 each 5    Ergocalciferol (VITAMIN D2 PO) Take 1.25 mg by mouth once a week       omeprazole (PRILOSEC) 20 MG capsule Take 40 mg by mouth daily          Allergies:  No Known Allergies    Problem List:    Patient Active Problem List   Diagnosis Code    Ast[facility] ([facility] J44.9    Shortness of breath R06.02    Tobacco abuse Z72.0    Degeneration of cervical intervertebral disc M50.30    Cervical radiculopathy, chronic M54.12    Displacement of cervical intervertebral disc without myelopathy M50.20    Cervical spondylosis without myelopathy M47.812    GERD (gastroesophageal reflux disease) K21.9    Coronary artery calcification seen on CT scan I25.10    Centrilobular emphysema ([facility] J43.2       Past Medical History:        Diagnosis Date    Asthma     COPD (chronic obstructive pulmonary disease) ([facility] 2015    mild    Coronary artery calcification seen on CT scan 2015    Degeneration of cervical intervertebral disc 9/13/2016    Heart attack ([facility]     Hypertension     Pneumonia        Past Surgical History:        Procedure Laterality Date    APPENDECTOMY      DIAGNOSTIC CARDIAC CATH LAB PROCEDURE  10/15    OTHER SURGICAL HISTORY N/A 04/07/2017    POSTERIOR CERVICAL LAMINOFORAMINOTOMY C3/4, C4/5, AND C5/6    SHOULDER SURGERY      SKIN GRAFT      TONSILLECTOMY         Social History:    Social History     Tobacco Use    Smoking status: Every Day     Packs/day: 1.50     Years: 50.00     Pack years: 75.00     Types: Cigarettes     Start date: 4/23/1966    Smokeless tobacco: Never    Tobacco comments:     09/06/2022 Less than 1 PPD    Substance Use Topics    Alcohol use:  Yes     Alcohol/week: 0.0 standard drinks     Comment: occassional                                Ready to quit: Not Answered  Counseling given: Not Answered  Tobacco comments: 09/06/2022 Less than 1 PPD       Vital Signs (Current):   Vitals:    10/18/22 1530   Weight: 130 lb (59 kg) Height: 5' 7\" (1.702 m)                                              BP Readings from Last 3 Encounters:   09/06/22 (!) 149/81   08/24/22 (!) 144/84   02/16/22 (!) 150/85       NPO Status:                                                                                 BMI:   Wt Readings from Last 3 Encounters:   09/06/22 135 lb 9.6 oz (61.5 kg)   08/24/22 131 lb (59.4 kg)   02/16/22 131 lb 12.8 oz (59.8 kg)     Body mass index is 20.36 kg/m². CBC:   Lab Results   Component Value Date/Time    WBC 6.9 04/07/2017 06:24 AM    RBC 4.41 04/07/2017 06:24 AM    HGB 13.0 04/07/2017 06:24 AM    HCT 39.1 04/07/2017 06:24 AM    MCV 88.5 04/07/2017 06:24 AM    RDW 13.5 04/07/2017 06:24 AM     04/07/2017 06:24 AM       CMP:   Lab Results   Component Value Date/Time     04/07/2017 06:24 AM    K 4.0 04/07/2017 06:24 AM     04/07/2017 06:24 AM    CO2 30 04/07/2017 06:24 AM    BUN 10 08/30/2017 09:04 AM    CREATININE 0.7 08/30/2017 09:04 AM    GFRAA >60 04/07/2017 06:24 AM    LABGLOM 84 08/30/2017 09:04 AM    LABGLOM >60 04/07/2017 06:24 AM    GLUCOSE 104 04/07/2017 06:24 AM    CALCIUM 9.2 04/07/2017 06:24 AM       POC Tests: No results for input(s): POCGLU, POCNA, POCK, POCCL, POCBUN, POCHEMO, POCHCT in the last 72 hours.     Coags: No results found for: PROTIME, INR, APTT    HCG (If Applicable): No results found for: PREGTESTUR, PREGSERUM, HCG, HCGQUANT     ABGs: No results found for: PHART, PO2ART, MCG5CBK, EPB8RNU, BEART, X8AWBRHE     Type & Screen (If Applicable):  No results found for: LABABO, LABRH    Drug/Infectious Status (If Applicable):  No results found for: HIV, HEPCAB    COVID-19 Screening (If Applicable): No results found for: COVID19        Anesthesia Evaluation  Patient summary reviewed and Nursing notes reviewed no history of anesthetic complications:   Airway: Mallampati: II     Neck ROM: full     Dental:          Pulmonary:Negative Pulmonary ROS and normal exam    (+) pneumonia:  COPD: shortness of breath:  asthma:                            Cardiovascular:Negative CV ROS    (+) hypertension:, past MI:, CAD (CT scan):,     (-)  angina                Neuro/Psych:   Negative Neuro/Psych ROS  (+) neuromuscular disease (carvical radiculopathy):,             GI/Hepatic/Renal: Neg GI/Hepatic/Renal ROS  (+) GERD: well controlled,      (-) hiatal hernia       Endo/Other: Negative Endo/Other ROS                    Abdominal:             Vascular: Other Findings:           Anesthesia Plan      general     ASA 3     (I discussed with the patient the risks and benefits of PIV, general anesthesia, IV Narcotics, PACU. All questions were answered the patient agrees with the plan and wishes to proceed.  )  Induction: intravenous. Pre-Operative Diagnosis: Goldman's esophagus with dysplasia [K22.719]; Special screening for malignant neoplasms, colon [Z12.11]    71 y.o.   BMI:  Body mass index is 20.36 kg/m². Vitals:    10/18/22 1530 10/24/22 1035   BP:  (!) 175/83   Pulse:  71   Resp:  16   Temp:  98.7 °F (37.1 °C)   TempSrc:  Temporal   SpO2:  97%   Weight: 130 lb (59 kg) 130 lb (59 kg)   Height: 5' 7\" (1.702 m) 5' 7\" (1.702 m)       No Known Allergies    Social History     Tobacco Use    Smoking status: Every Day     Packs/day: 1.00     Years: 50.00     Pack years: 50.00     Types: Cigarettes     Start date: 4/23/1966    Smokeless tobacco: Never    Tobacco comments:     09/06/2022 Less than 1 PPD    Substance Use Topics    Alcohol use:  Yes     Alcohol/week: 0.0 standard drinks     Comment: occassional       LABS:    CBC  Lab Results   Component Value Date/Time    WBC 6.9 04/07/2017 06:24 AM    HGB 13.0 04/07/2017 06:24 AM    HCT 39.1 04/07/2017 06:24 AM     04/07/2017 06:24 AM     RENAL  Lab Results   Component Value Date/Time     04/07/2017 06:24 AM    K 4.0 04/07/2017 06:24 AM     04/07/2017 06:24 AM    CO2 30 04/07/2017 06:24 AM    BUN 10 08/30/2017 09:04 AM    CREATININE 0.7 08/30/2017 09:04 AM    GLUCOSE 104 (H) 04/07/2017 06:24 AM     COAGS  No results found for: PROTIME, INR, APTT      Ira Edge MD   10/22/2022

## 2022-10-24 ENCOUNTER — HOSPITAL ENCOUNTER (OUTPATIENT)
Age: 72
Setting detail: OUTPATIENT SURGERY
Discharge: HOME | End: 2022-10-24
Attending: INTERNAL MEDICINE | Admitting: INTERNAL MEDICINE
Payer: MEDICARE

## 2022-10-24 ENCOUNTER — ANESTHESIA (OUTPATIENT)
Dept: ENDOSCOPY | Age: 72
End: 2022-10-24
Payer: MEDICARE

## 2022-10-24 VITALS
OXYGEN SATURATION: 99 % | SYSTOLIC BLOOD PRESSURE: 160 MMHG | DIASTOLIC BLOOD PRESSURE: 78 MMHG | WEIGHT: 130 LBS | RESPIRATION RATE: 14 BRPM | BODY MASS INDEX: 20.4 KG/M2 | HEIGHT: 67 IN | TEMPERATURE: 97.5 F | HEART RATE: 84 BPM

## 2022-10-24 DIAGNOSIS — Z12.11 SPECIAL SCREENING FOR MALIGNANT NEOPLASMS, COLON: ICD-10-CM

## 2022-10-24 DIAGNOSIS — K22.719 BARRETT'S ESOPHAGUS WITH DYSPLASIA: ICD-10-CM

## 2022-10-24 PROCEDURE — 2580000003 HC RX 258: Performed by: INTERNAL MEDICINE

## 2022-10-24 PROCEDURE — 6360000002 HC RX W HCPCS: Performed by: NURSE ANESTHETIST, CERTIFIED REGISTERED

## 2022-10-24 PROCEDURE — 88312 SPECIAL STAINS GROUP 1: CPT

## 2022-10-24 PROCEDURE — 3700000001 HC ADD 15 MINUTES (ANESTHESIA): Performed by: INTERNAL MEDICINE

## 2022-10-24 PROCEDURE — 7100000011 HC PHASE II RECOVERY - ADDTL 15 MIN: Performed by: INTERNAL MEDICINE

## 2022-10-24 PROCEDURE — 3700000000 HC ANESTHESIA ATTENDED CARE: Performed by: INTERNAL MEDICINE

## 2022-10-24 PROCEDURE — 3609012400 HC EGD TRANSORAL BIOPSY SINGLE/MULTIPLE: Performed by: INTERNAL MEDICINE

## 2022-10-24 PROCEDURE — 3609027000 HC COLONOSCOPY: Performed by: INTERNAL MEDICINE

## 2022-10-24 PROCEDURE — 2709999900 HC NON-CHARGEABLE SUPPLY: Performed by: INTERNAL MEDICINE

## 2022-10-24 PROCEDURE — 2500000003 HC RX 250 WO HCPCS: Performed by: NURSE ANESTHETIST, CERTIFIED REGISTERED

## 2022-10-24 PROCEDURE — 7100000010 HC PHASE II RECOVERY - FIRST 15 MIN: Performed by: INTERNAL MEDICINE

## 2022-10-24 PROCEDURE — 88305 TISSUE EXAM BY PATHOLOGIST: CPT

## 2022-10-24 RX ORDER — GLYCOPYRROLATE 0.2 MG/ML
INJECTION INTRAMUSCULAR; INTRAVENOUS PRN
Status: DISCONTINUED | OUTPATIENT
Start: 2022-10-24 | End: 2022-10-24 | Stop reason: SDUPTHER

## 2022-10-24 RX ORDER — PROPOFOL 10 MG/ML
INJECTION, EMULSION INTRAVENOUS PRN
Status: DISCONTINUED | OUTPATIENT
Start: 2022-10-24 | End: 2022-10-24 | Stop reason: SDUPTHER

## 2022-10-24 RX ORDER — LIDOCAINE HYDROCHLORIDE 20 MG/ML
INJECTION, SOLUTION INFILTRATION; PERINEURAL PRN
Status: DISCONTINUED | OUTPATIENT
Start: 2022-10-24 | End: 2022-10-24 | Stop reason: SDUPTHER

## 2022-10-24 RX ORDER — SODIUM CHLORIDE, SODIUM LACTATE, POTASSIUM CHLORIDE, CALCIUM CHLORIDE 600; 310; 30; 20 MG/100ML; MG/100ML; MG/100ML; MG/100ML
INJECTION, SOLUTION INTRAVENOUS CONTINUOUS
Status: DISCONTINUED | OUTPATIENT
Start: 2022-10-24 | End: 2022-10-24 | Stop reason: HOSPADM

## 2022-10-24 RX ADMIN — LIDOCAINE HYDROCHLORIDE 60 MG: 20 INJECTION, SOLUTION INFILTRATION; PERINEURAL at 11:39

## 2022-10-24 RX ADMIN — GLYCOPYRROLATE 0.2 MG: 0.2 INJECTION, SOLUTION INTRAMUSCULAR; INTRAVENOUS at 11:27

## 2022-10-24 RX ADMIN — PROPOFOL 300 MG: 10 INJECTION, EMULSION INTRAVENOUS at 11:39

## 2022-10-24 RX ADMIN — SODIUM CHLORIDE, POTASSIUM CHLORIDE, SODIUM LACTATE AND CALCIUM CHLORIDE: 600; 310; 30; 20 INJECTION, SOLUTION INTRAVENOUS at 11:27

## 2022-10-24 ASSESSMENT — PAIN - FUNCTIONAL ASSESSMENT: PAIN_FUNCTIONAL_ASSESSMENT: NONE - DENIES PAIN

## 2022-10-24 NOTE — PROGRESS NOTES
Bedside report received from [address] [place] [address] and CRNA, patient resting in bed no distress noted vitals WNL to pre-op baseline. Sriram Denise RN

## 2022-10-24 NOTE — H&P
[facility] END[facility]  Outpatient Procedure H&P    Patient: Can Bell MRN: [id number]     YOB: 1950  Age: 71 y.o. Sex: female    Unit: [facility] Room/Bed: ENDO/NONE Location: [address]     Procedure: Procedure(s):  EGD BIOPSY  COLON W/ANES. Indication:dyspepsia  Referring  Physician:        Brief history: Chronic heartburn    Nurses past medical history notes reviewed and agreed. Medications reviewed. Allergies: Patient has no known allergies. Allergies noted: Yes     Past Medical History:   Past Medical History:   Diagnosis Date    Asthma     COPD (chronic obstructive pulmonary disease) ([facility] 2015    mild    Coronary artery calcification seen on CT scan 2015    Degeneration of cervical intervertebral disc 9/13/2016    Heart attack ([facility]     Hypertension     Pneumonia        Past Surgical History:   Past Surgical History:   Procedure Laterality Date    APPENDECTOMY      COLONOSCOPY N/A 10/24/2022    COLON W/ANES. performed by Erika Fuchs DO at [address] [facility] CATH LAB PROCEDURE  10/2015    OTHER SURGICAL HISTORY N/A 04/07/2017    POSTERIOR CERVICAL LAMINOFORAMINOTOMY C3/4, C4/5, AND C5/6    SHOULDER SURGERY      SKIN GRAFT      TONSILLECTOMY      UPPER GASTROINTESTINAL ENDOSCOPY N/A 10/24/2022    EGD BIOPSY performed by Erika Fuchs DO at [address] [facility]  02/03/2022       Social History:   Social History     Socioeconomic History    Marital status:       Spouse name: Not on file    Number of children: Not on file    Years of education: Not on file    Highest education level: Not on file   Occupational History    Not on file   Tobacco Use    Smoking status: Every Day     Packs/day: 1.00     Years: 50.00     Pack years: 50.00     Types: Cigarettes     Start date: 4/23/1966    Smokeless tobacco: Never    Tobacco comments:     09/06/2022 Less than 1 PPD    Vaping Use    Vaping Use: Never used Substance and Sexual Activity    Alcohol use: Yes     Alcohol/week: 0.0 standard drinks     Comment: occassional    Drug use: No    Sexual activity: Yes     Partners: Male   Other Topics Concern    Not on file   Social History Narrative    Not on file     Social Determinants of Health     Financial Resource Strain: Not on file   Food Insecurity: Not on file   Transportation Needs: Not on file   Physical Activity: Not on file   Stress: Not on file   Social Connections: Not on file   Intimate Partner Violence: Not on file   Housing Stability: Not on file       Family History:   Family History   Adopted: Yes   Problem Relation Age of Onset    No Known Problems Mother     No Known Problems Father        Home Medications:   Prior to Admission medications    Medication Sig Start Date End Date Taking?  Authorizing Provider   spironolactone (ALDACTONE) 25 MG tablet Take 25 mg by mouth daily   Yes Historical Provider, MD   Budeson-Glycopyrrol-Formoterol (BREZTRI AEROSPHERE) 160-9-4.8 MCG/ACT AERO Inhale 2 puffs into the lungs 2 times daily 9/6/22   Dell Hahn MD   atorvastatin (LIPITOR) 20 MG tablet  1/17/22   Historical Provider, MD   albuterol sulfate HFA (PROVENTIL HFA) 108 (90 Base) MCG/ACT inhaler INHALE 2 PUFFS BY MOUTH EVERY SIX HOURS AS NEEDED 8/18/21   Veda Meza MD   albuterol (PROVENTIL) (2.5 MG/3ML) 0.083% nebulizer solution Take 3 mLs by nebulization every 6 hours as needed for Wheezing or Shortness of Breath 8/18/21   Veda Meza MD   Ergocalciferol (VITAMIN D2 PO) Take 1.25 mg by mouth once a week     Historical Provider, MD   omeprazole (PRILOSEC) 20 MG capsule Take 40 mg by mouth daily     Historical Provider, MD       Review of Systems:  Weight Loss: No  Dysphagia: No  Dyspepsia: No    Physical Exam:   Vital Signs: BP (!) 160/78   Pulse 84   Temp 97.5 °F (36.4 °C) (Infrared)   Resp 14   Ht 5' 7\" (1.702 m)   Wt 130 lb (59 kg)   SpO2 99%   BMI 20.36 kg/m²   Vital signs reviewed:Yes    HEENT:Normal  Cardiac:Normal  Chest:Normal  Abdomen:Normal  Exts: Normal  Neuro:Normal    Labs:  No visits with results within 12 Week(s) from this visit. Latest known visit with results is:   Orders Only on 08/30/2017   Component Date Value Ref Range Status    BUN 08/30/2017 10  5 - 19 mg/dL Final    Creatinine 08/30/2017 0.7  0.6 - 1.3 mg/dL Final    Gfr Calculated 08/30/2017 84   Final        Imaging:  No orders to display       ASA:2    Mallampati Score: II    Sedation planned:MAC    Patient in acceptable condition for procedure:Yes    5:45 PM 10/24/2022    Sidney Knapp, DO      Please note that some or all of this record was generated using voice recognition software. If there are any questions about the content of this document, please contact the author as some errors in transcription may have occurred. The patient and I discussed that this is an elective procedure/surgery. We discussed the risks of the procedure/surgery, including but not limited to what is outlined in the signed informed consent. We also discussed the risk of laureano COVID 19 while in the facility. We discussed the increased risk of a bad outcome should the patient contract COVID 19 during the post-procedural/post-operative period, given the patients current health condition, chronic conditions, and the added risk of COVID 19 in light of these conditions. The patient and I also discussed the risk of further postponing the procedure/surgery and other treatment alternatives, including non-procedural/surgical treatments. Understanding all of the risks, benefits, and alternatives, the patient made an informed decision to proceed with the procedure/surgery.

## 2022-10-24 NOTE — DISCHARGE INSTRUCTIONS
PATIENT INSTRUCTIONS  POST-SEDATION    Lily Anne          IMMEDIATELY FOLLOWING PROCEDURE:    Do not drive or operate machinery for the first twenty four hours after surgery. Do not make any important decisions for twenty four hours after surgery or while taking narcotic pain medications or sedatives. You should NOT BE LEFT UNATTENDED OR ALONE. A responsible adult should be with you for the rest of the day of your procedure and also during the night for your protection and safety. You may experience some light headedness. Rest at home with activity as tolerated. You may not need to go to bed, but it is important to rest for the next 24 hours. You should not engage in athletic sports such as basketball, volleyball, jogging, skating, or activities requiring refined motor skills for 24 hours. If you develop intractable nausea and vomiting or a severe headache please notify your doctor immediately. You are not expected to have any fever, but if you feel warm, take your temperature. If you have a fever 101 degrees or higher, call your doctor. If you have had an Endoscopy:   *Eat lightly for your first meal and gradually resume your normal / prescribed diet. DO NOT eat or drink until your gag reflex returns. *If you have a sore throat you may use lozenges, or salt water gargles. *If you have had a colonoscopy, do not expect a normal bowel movement for approximately three days due to the cleansing of the large intestine prior to colonoscopy. ONCE YOU ARE HOME, IF YOU SHOULD HAVE:  Difficulty in breathing, persistent nausea or vomiting, bleeding you feel is excessive, or pain that is unusual, increased abdominal bloating, or any swelling, fever / chills, call your physician. If you cannot contact your physician, but feel that your signs and symptoms need a physician's attention, go to the Emergency Department.       FOLLOW-UP:    Please follow up with Dr. Julio C Pastor as scheduled or needed. Dr. Rubi Menjivar office will call you with the biopsy findings. Call Dr. Jim Trinh if there are any GI concerns. [phone]      Repeat Colonoscopy in 10 years. Repeat Colonoscopy based on polyp results.

## 2022-10-24 NOTE — ANESTHESIA POSTPROCEDURE EVALUATION
Department of Anesthesiology  Postprocedure Note    Patient: Zeke Chaudhry  MRN: [id number]  YOB: 1950  Date of evaluation: 10/24/2022      Procedure Summary     Date: 10/24/22 Room / Location: [place] [address] / [facility]    Anesthesia Start: 1127 Anesthesia Stop: 4409    Procedures:       EGD BIOPSY      COLON W/ANES. Diagnosis:       Durant's esophagus with dysplasia      Special screening for malignant neoplasms, colon      (DURANT'S, SCREENING)    Surgeons: Ty Zarate DO Responsible Provider: Aurther Sever, MD    Anesthesia Type: general ASA Status: 3          Anesthesia Type: No value filed.     Mary Ann Phase I: Mary Ann Score: 10    Mary Ann Phase II: Mary Ann Score: 10      Anesthesia Post Evaluation    Comments: Postoperative Anesthesia Note    Name:    Zeke Chaudhry  MRN:      [id number]    Patient Vitals in the past 12 hrs:  10/24/22 1229, BP:(!) 160/78, Temp:97.5 °F (36.4 °C), Temp src:Infrared, Pulse:84, Resp:14, SpO2:99 %  10/24/22 1208, BP:127/73, Temp:97.5 °F (36.4 °C), Temp src:Infrared, Pulse:92, Resp:14, SpO2:98 %  10/24/22 1035, BP:(!) 175/83, Temp:98.7 °F (37.1 °C), Temp src:Temporal, Pulse:71, Resp:16, SpO2:97 %, Height:5' 7\" (1.702 m), Weight:130 lb (59 kg)     LABS:    CBC  Lab Results       Component                Value               Date/Time                  WBC                      6.9                 04/07/2017 06:24 AM        HGB                      13.0                04/07/2017 06:24 AM        HCT                      39.1                04/07/2017 06:24 AM        PLT                      257                 04/07/2017 06:24 AM   RENAL  Lab Results       Component                Value               Date/Time                  NA                       140                 04/07/2017 06:24 AM        K                        4.0                 04/07/2017 06:24 AM        CL                       101                 04/07/2017 06:24 AM CO2                      30                  04/07/2017 06:24 AM        BUN                      10                  08/30/2017 09:04 AM        CREATININE               0.7                 08/30/2017 09:04 AM        GLUCOSE                  104 (H)             04/07/2017 06:24 AM   COAGS  No results found for: PROTIME, INR, APTT    Intake & Output:  @58MRHU@    Nausea & Vomiting:  No    Level of Consciousness:  Awake    Pain Assessment:  Adequate analgesia    Anesthesia Complications:  No apparent anesthetic complications    SUMMARY      Vital signs stable  OK to discharge from Stage I post anesthesia care.   Care transferred from Anesthesiology department on discharge from perioperative area

## 2022-10-24 NOTE — PROGRESS NOTES
Nathan Relic over discharge instructions with patient and family both verbalize and understand discharge instructions. IV removed and documented. Patient left surgery floor in stable condition to home with family and all belongings.

## 2023-02-06 ENCOUNTER — HOSPITAL ENCOUNTER (EMERGENCY)
Age: 73
Discharge: ANOTHER HOSPITAL | End: 2023-02-06
Attending: STUDENT IN AN ORGANIZED HEALTH CARE EDUCATION/TRAINING PROGRAM
Payer: MEDICARE

## 2023-02-06 ENCOUNTER — APPOINTMENT (OUTPATIENT)
Dept: CT IMAGING | Age: 73
End: 2023-02-06
Payer: MEDICARE

## 2023-02-06 ENCOUNTER — HOSPITAL ENCOUNTER (INPATIENT)
Age: 73
LOS: 2 days | Discharge: HOME | DRG: 552 | End: 2023-02-08
Attending: INTERNAL MEDICINE | Admitting: INTERNAL MEDICINE
Payer: MEDICARE

## 2023-02-06 ENCOUNTER — APPOINTMENT (OUTPATIENT)
Dept: GENERAL RADIOLOGY | Age: 73
End: 2023-02-06
Payer: MEDICARE

## 2023-02-06 VITALS
WEIGHT: 135 LBS | HEIGHT: 67 IN | SYSTOLIC BLOOD PRESSURE: 126 MMHG | DIASTOLIC BLOOD PRESSURE: 82 MMHG | TEMPERATURE: 98.6 F | RESPIRATION RATE: 16 BRPM | HEART RATE: 87 BPM | OXYGEN SATURATION: 100 % | BODY MASS INDEX: 21.19 KG/M2

## 2023-02-06 DIAGNOSIS — R29.818 FOCAL NEUROLOGICAL DEFICIT: Primary | ICD-10-CM

## 2023-02-06 PROBLEM — R20.2 NUMBNESS AND TINGLING: Status: ACTIVE | Noted: 2023-02-06

## 2023-02-06 PROBLEM — R20.0 NUMBNESS AND TINGLING: Status: ACTIVE | Noted: 2023-02-06

## 2023-02-06 LAB
A/G RATIO: 1.6 (ref 1.1–2.2)
ALBUMIN SERPL-MCNC: 4.3 G/DL (ref 3.4–5)
ALP BLD-CCNC: 84 U/L (ref 40–129)
ALT SERPL-CCNC: 12 U/L (ref 10–40)
ANION GAP SERPL CALCULATED.3IONS-SCNC: 11 MMOL/L (ref 3–16)
AST SERPL-CCNC: 15 U/L (ref 15–37)
BASOPHILS ABSOLUTE: 0.1 K/UL (ref 0–0.2)
BASOPHILS RELATIVE PERCENT: 0.9 %
BILIRUB SERPL-MCNC: 0.3 MG/DL (ref 0–1)
BUN BLDV-MCNC: 12 MG/DL (ref 7–20)
CALCIUM SERPL-MCNC: 8.7 MG/DL (ref 8.3–10.6)
CHLORIDE BLD-SCNC: 98 MMOL/L (ref 99–110)
CHP ED QC CHECK: NORMAL
CO2: 24 MMOL/L (ref 21–32)
CREAT SERPL-MCNC: 0.7 MG/DL (ref 0.6–1.2)
EKG ATRIAL RATE: 87 BPM
EKG DIAGNOSIS: NORMAL
EKG P AXIS: 75 DEGREES
EKG P-R INTERVAL: 168 MS
EKG Q-T INTERVAL: 372 MS
EKG QRS DURATION: 86 MS
EKG QTC CALCULATION (BAZETT): 447 MS
EKG R AXIS: -67 DEGREES
EKG T AXIS: 80 DEGREES
EKG VENTRICULAR RATE: 87 BPM
EOSINOPHILS ABSOLUTE: 0.1 K/UL (ref 0–0.6)
EOSINOPHILS RELATIVE PERCENT: 1.1 %
GFR SERPL CREATININE-BSD FRML MDRD: >60 ML/MIN/{1.73_M2}
GLUCOSE BLD-MCNC: 131 MG/DL (ref 70–99)
GLUCOSE BLD-MCNC: 132 MG/DL
HCT VFR BLD CALC: 46.1 % (ref 36–48)
HEMOGLOBIN: 16 G/DL (ref 12–16)
INR BLD: 0.92 (ref 0.87–1.14)
LYMPHOCYTES ABSOLUTE: 2.2 K/UL (ref 1–5.1)
LYMPHOCYTES RELATIVE PERCENT: 24.7 %
MCH RBC QN AUTO: 32.4 PG (ref 26–34)
MCHC RBC AUTO-ENTMCNC: 34.7 G/DL (ref 31–36)
MCV RBC AUTO: 93.6 FL (ref 80–100)
MONOCYTES ABSOLUTE: 0.5 K/UL (ref 0–1.3)
MONOCYTES RELATIVE PERCENT: 5.3 %
NEUTROPHILS ABSOLUTE: 6.1 K/UL (ref 1.7–7.7)
NEUTROPHILS RELATIVE PERCENT: 68 %
PDW BLD-RTO: 13.5 % (ref 12.4–15.4)
PLATELET # BLD: 330 K/UL (ref 135–450)
PMV BLD AUTO: 7.4 FL (ref 5–10.5)
POTASSIUM REFLEX MAGNESIUM: 3.6 MMOL/L (ref 3.5–5.1)
PROTHROMBIN TIME: 12.2 SEC (ref 11.7–14.5)
RBC # BLD: 4.92 M/UL (ref 4–5.2)
SODIUM BLD-SCNC: 133 MMOL/L (ref 136–145)
TOTAL PROTEIN: 7 G/DL (ref 6.4–8.2)
TROPONIN: <0.01 NG/ML
WBC # BLD: 9 K/UL (ref 4–11)

## 2023-02-06 PROCEDURE — 36415 COLL VENOUS BLD VENIPUNCTURE: CPT

## 2023-02-06 PROCEDURE — 93005 ELECTROCARDIOGRAM TRACING: CPT | Performed by: STUDENT IN AN ORGANIZED HEALTH CARE EDUCATION/TRAINING PROGRAM

## 2023-02-06 PROCEDURE — 99285 EMERGENCY DEPT VISIT HI MDM: CPT

## 2023-02-06 PROCEDURE — 93010 ELECTROCARDIOGRAM REPORT: CPT | Performed by: INTERNAL MEDICINE

## 2023-02-06 PROCEDURE — 6370000000 HC RX 637 (ALT 250 FOR IP): Performed by: STUDENT IN AN ORGANIZED HEALTH CARE EDUCATION/TRAINING PROGRAM

## 2023-02-06 PROCEDURE — 80053 COMPREHEN METABOLIC PANEL: CPT

## 2023-02-06 PROCEDURE — 1200000000 HC SEMI PRIVATE

## 2023-02-06 PROCEDURE — 70450 CT HEAD/BRAIN W/O DYE: CPT

## 2023-02-06 PROCEDURE — 6360000004 HC RX CONTRAST MEDICATION: Performed by: STUDENT IN AN ORGANIZED HEALTH CARE EDUCATION/TRAINING PROGRAM

## 2023-02-06 PROCEDURE — 70496 CT ANGIOGRAPHY HEAD: CPT

## 2023-02-06 PROCEDURE — 85025 COMPLETE CBC W/AUTO DIFF WBC: CPT

## 2023-02-06 PROCEDURE — 84484 ASSAY OF TROPONIN QUANT: CPT

## 2023-02-06 PROCEDURE — 85610 PROTHROMBIN TIME: CPT

## 2023-02-06 PROCEDURE — 71045 X-RAY EXAM CHEST 1 VIEW: CPT

## 2023-02-06 RX ORDER — NICOTINE 21 MG/24HR
1 PATCH, TRANSDERMAL 24 HOURS TRANSDERMAL DAILY
Status: DISCONTINUED | OUTPATIENT
Start: 2023-02-06 | End: 2023-02-06 | Stop reason: HOSPADM

## 2023-02-06 RX ORDER — ONDANSETRON 2 MG/ML
4 INJECTION INTRAMUSCULAR; INTRAVENOUS EVERY 6 HOURS PRN
Status: DISCONTINUED | OUTPATIENT
Start: 2023-02-06 | End: 2023-02-08 | Stop reason: HOSPADM

## 2023-02-06 RX ORDER — ASPIRIN 300 MG/1
300 SUPPOSITORY RECTAL DAILY
Status: DISCONTINUED | OUTPATIENT
Start: 2023-02-07 | End: 2023-02-08 | Stop reason: HOSPADM

## 2023-02-06 RX ORDER — ATORVASTATIN CALCIUM 80 MG/1
80 TABLET, FILM COATED ORAL NIGHTLY
Status: DISCONTINUED | OUTPATIENT
Start: 2023-02-06 | End: 2023-02-08 | Stop reason: HOSPADM

## 2023-02-06 RX ORDER — ENOXAPARIN SODIUM 100 MG/ML
40 INJECTION SUBCUTANEOUS DAILY
Status: DISCONTINUED | OUTPATIENT
Start: 2023-02-07 | End: 2023-02-08 | Stop reason: HOSPADM

## 2023-02-06 RX ORDER — ASPIRIN 81 MG/1
81 TABLET ORAL DAILY
Status: DISCONTINUED | OUTPATIENT
Start: 2023-02-07 | End: 2023-02-08 | Stop reason: HOSPADM

## 2023-02-06 RX ORDER — POLYETHYLENE GLYCOL 3350 17 G/17G
17 POWDER, FOR SOLUTION ORAL DAILY PRN
Status: DISCONTINUED | OUTPATIENT
Start: 2023-02-06 | End: 2023-02-08 | Stop reason: HOSPADM

## 2023-02-06 RX ORDER — ONDANSETRON 4 MG/1
4 TABLET, ORALLY DISINTEGRATING ORAL EVERY 8 HOURS PRN
Status: DISCONTINUED | OUTPATIENT
Start: 2023-02-06 | End: 2023-02-08 | Stop reason: HOSPADM

## 2023-02-06 RX ORDER — ASPIRIN 81 MG/1
324 TABLET, CHEWABLE ORAL ONCE
Status: COMPLETED | OUTPATIENT
Start: 2023-02-06 | End: 2023-02-06

## 2023-02-06 RX ADMIN — IOPAMIDOL 85 ML: 755 INJECTION, SOLUTION INTRAVENOUS at 14:48

## 2023-02-06 RX ADMIN — ASPIRIN 324 MG: 81 TABLET, CHEWABLE ORAL at 16:06

## 2023-02-06 ASSESSMENT — PAIN SCALES - GENERAL: PAINLEVEL_OUTOF10: 0

## 2023-02-06 ASSESSMENT — PAIN - FUNCTIONAL ASSESSMENT
PAIN_FUNCTIONAL_ASSESSMENT: NONE - DENIES PAIN
PAIN_FUNCTIONAL_ASSESSMENT: NONE - DENIES PAIN

## 2023-02-06 NOTE — ED NOTES
[address] [address]  called a Code Stroke  [address] stated they're ready for pt.  [facility] [address] Stroke team.  stated a Jim Heap should be the one to call back.   [facility] with  Stroke team called back and spoke with Dr.Gaydos Rojelio Lloyd  02/06/23 8445

## 2023-02-06 NOTE — ED PROVIDER NOTES
[address] ED      CHIEF COMPLAINT  Numbness (Patient comes in due to numbness in her L arm that started arounf 0800 and then this after noon she started breathing hard and \"became disoriented\" )       HISTORY OF PRESENT ILLNESS  Taqueria Matthews is a 71 y.o. female  who presents to the ED complaining of left arm numbness/tingling and confusion. Patient states that she was on her way to physical therapy which start developing numbness and tingling in the ulnar aspect of her left arm around 930 (5 hrs PTA). Shortly after this she developed numbness and tingling throughout the entire left upper extremity. She finished her physical therapy. Afterwards she was driving to a car wash when she got lost in an area that she is normally familiar with. On arrival she is no longer feeling disoriented, but the numbness and tingling have persisted. Denies any headache, nausea, vomiting, chest pain, shortness of breath. Notes that the arm sensation feels similar to when she had \"a silent heart attack. \"    No other complaints, modifying factors or associated symptoms. I have reviewed the following from the nursing documentation. Past Medical History:   Diagnosis Date    Asthma     COPD (chronic obstructive pulmonary disease) ([address] 2015    mild    Coronary artery calcification seen on CT scan 2015    Degeneration of cervical intervertebral disc 9/13/2016    Heart attack ([address]     Hypertension     Pneumonia      Past Surgical History:   Procedure Laterality Date    APPENDECTOMY      COLONOSCOPY N/A 10/24/2022    COLON W/ANES.  performed by Mahsa Bhardwaj DO at [address] [facility] CATH LAB PROCEDURE  10/2015    OTHER SURGICAL HISTORY N/A 04/07/2017    POSTERIOR CERVICAL LAMINOFORAMINOTOMY C3/4, C4/5, AND C5/6    SHOULDER SURGERY      SKIN GRAFT      TONSILLECTOMY      UPPER GASTROINTESTINAL ENDOSCOPY N/A 10/24/2022    EGD BIOPSY performed by Mahsa Bhardwaj DO at [address] [facility] ENDOSCOPY    URETHRAL SLING  02/03/2022     Family History   Adopted: Yes   Problem Relation Age of Onset    No Known Problems Mother     No Known Problems Father      Social History     Socioeconomic History    Marital status:      Spouse name: Not on file    Number of children: Not on file    Years of education: Not on file    Highest education level: Not on file   Occupational History    Not on file   Tobacco Use    Smoking status: Every Day     Packs/day: 1.00     Years: 50.00     Pack years: 50.00     Types: Cigarettes     Start date: 4/23/1966    Smokeless tobacco: Never    Tobacco comments:     09/06/2022 Less than 1 PPD    Vaping Use    Vaping Use: Never used   Substance and Sexual Activity    Alcohol use: Yes     Alcohol/week: 0.0 standard drinks     Comment: occassional    Drug use: No    Sexual activity: Yes     Partners: Male   Other Topics Concern    Not on file   Social History Narrative    Not on file     Social Determinants of Health     Financial Resource Strain: Not on file   Food Insecurity: Not on file   Transportation Needs: Not on file   Physical Activity: Not on file   Stress: Not on file   Social Connections: Not on file   Intimate Partner Violence: Not on file   Housing Stability: Not on file     No current facility-administered medications for this encounter.      Current Outpatient Medications   Medication Sig Dispense Refill    spironolactone (ALDACTONE) 25 MG tablet Take 25 mg by mouth daily      Budeson-Glycopyrrol-Formoterol (BREZTRI AEROSPHERE) 160-9-4.8 MCG/ACT AERO Inhale 2 puffs into the lungs 2 times daily 4 each 0    atorvastatin (LIPITOR) 20 MG tablet       albuterol sulfate HFA (PROVENTIL HFA) 108 (90 Base) MCG/ACT inhaler INHALE 2 PUFFS BY MOUTH EVERY SIX HOURS AS NEEDED 6.7 g 5    albuterol (PROVENTIL) (2.5 MG/3ML) 0.083% nebulizer solution Take 3 mLs by nebulization every 6 hours as needed for Wheezing or Shortness of Breath 120 each 5    Ergocalciferol (VITAMIN D2 PO) Take 1.25 mg by mouth once a week       omeprazole (PRILOSEC) 20 MG capsule Take 40 mg by mouth daily        No Known Allergies    REVIEW OF SYSTEMS  10 systems reviewed, pertinent positives per HPI otherwise noted to be negative. PHYSICAL EXAM  BP (!) 150/81   Pulse 89   Temp 98.6 °F (37 °C) (Oral)   Resp 19   Ht 5' 7\" (1.702 m)   Wt 135 lb (61.2 kg)   SpO2 95%   BMI 21.14 kg/m²    General: Appears well. Alert  HEENT: Head atraumatic, Eyes normal inspection, PERRL. Normal ENT inspection, Pharynx normal. No signs of dehydration  NECK: Normal inspection  RESPIRATORY: Normal breath sounds. No chest wall tenderness. No respiratory distress  CVS: Heart rate and rhythm regular. No Murmurs  ABDOMEN/GI: Soft, Non-tender, No distention  BACK: Normal inspection  EXTREMITIES: Non-Tender. Full ROM. Normal appearance. No Pedal edema  NEURO: Alert and oriented. Decree sensation the left upper extremity to light touch globally. Motor normal  PSYCH: Mood normal. Affect normal.  SKIN: Color normal. No rash. Warm, Dry    NIH Stroke Scale  Interval: Baseline  Level of Consciousness (1a): Alert  LOC Questions (1b): Answers both correctly  LOC Commands (1c): Performs both tasks correctly  Best Gaze (2): Normal  Visual (3): No visual loss  Facial Palsy (4): Normal symmetrical movement  Motor Arm, Left (5a): No drift  Motor Arm, Right (5b): No drift  Motor Leg, Left (6a): No drift  Motor Leg, Right (6b): No drift  Limb Ataxia (7): Absent  Sensory (8): (!) Mild to Moderate  Best Language (9): No aphasia  Dysarthria (10): Normal  Extinction and Inattention (11): No abnormality  Total: 1    LABS  I have reviewed all labs for this visit. No results found for this visit on 02/06/23. ECG  The Ekg interpreted by me shows  Sinus rhythm with a rate of 87 bpm.  Left axis deviation. No acute injury pattern. , QRS 86, QTc 447.     RADIOLOGY  XR CHEST PORTABLE   Final Result   Radiographically nonacute portable chest. CTA HEAD NECK W CONTRAST   Final Result   No flow limiting stenosis or occlusion within the head or neck. 2 mm dorsally projecting conical outpouching involving the left ICA terminus,   either reflecting a small aneurysm or infundibulum associated with the   anterior choroidal artery. CT HEAD WO CONTRAST   Final Result   No noncontrast CT evidence of acute intracranial pathology. Cortical   atrophy, subcortical white matter changes consistent with microvascular   degenerative disease incidentally noted. Findings consistent with focal mild chronic inflammatory sinus disease right   side of septated sphenoid sinus and posterior right ethmoid air cells. ED COURSE/MDM  Patient seen and evaluated. Old records reviewed. Labs and imaging reviewed and results discussed with patient. DIFFERENTIAL DX  CVA, TIA, ACS, radiculopathy, polyneuropathy    Code stroke called due to neurologic symptoms less than 24 hours. Discussed with  stroke team who agreed that patient was not a TNKase candidate as her symptoms are not debilitating and her symptoms started 5 hours ago. CT head and CTA head and neck show no acute process. Lab work is reassuring. Patient treated with aspirin. Left arm paresthesias are still present. Recommended transfer for further evaluation patient agrees. Discussed with Dr. Christiane King at [facility] who agreed to admit the patient to his service. Is this patient to be included in the SEP-1 Core Measure due to severe sepsis or septic shock? No   Exclusion criteria - the patient is NOT to be included for SEP-1 Core Measure due to:   Infection is not suspected    During the patient's ED course, the patient was given:  Medications   nicotine (NICODERM CQ) 21 MG/24HR 1 patch (1 patch TransDERmal Patch Applied 2/6/23 6097)   iopamidol (ISOVUE-370) 76 % injection 85 mL (85 mLs IntraVENous Given 2/6/23 1448)   aspirin chewable tablet 324 mg (324 mg Oral Given 2/6/23 1606) Clint Pritchett DO,  am the primary clinician of record. CLINICAL IMPRESSION  1. Focal neurological deficit        Blood pressure (!) 146/74, pulse 92, temperature 98.6 °F (37 °C), temperature source Oral, resp. rate 24, height 5' 7\" (1.702 m), weight 135 lb (61.2 kg), SpO2 97 %. DISPOSITION  Pereira Head was transferred to [facility]  in stable condition. Patient was given scripts for the following medications. I counseled patient how to take these medications. New Prescriptions    No medications on file       Follow-up with:  No follow-up provider specified. DISCLAIMER: This chart was created using Dragon dictation software. Efforts were made by me to ensure accuracy, however some errors may be present due to limitations of this technology and occasionally words are not transcribed correctly.         Giana Snyder DO  02/06/23 8214

## 2023-02-06 NOTE — ED NOTES
[facility] Dermatology Note   Encounter Date: Mar 11, 2021  Office Visit     Dermatology Problem List:  # Keloid (suspected), right chest  - [id number]  ___________________________________________    Assessment & Plan:    # Keloid, right chest  * chronic, uncomplicated  - explained nature of disease and treatment options  - ILK injection (see procedure note)    # Mild penile irritation  - suspect irritant dermatitis  - desonide ointment BID PRN  - gentle skin care reviewed with Vaseline    # Seb derm (scalp)  * chronic uncomplicated  - ketoconazole shampoo 2-3 times weekly     Procedures Performed:  Kenalog intralesional injection procedure note  - location(s): right chest  - strength: 30 mg/ml  - volume: 1 ml  - number of injections: 4  After verbal consent and discussion of risks including but not limited to atrophy, pain, and bruising, time out was performed, patient positioned, area cleaned with alcohol, Kenalog injected into affected sites; patient tolerated procedure well      Follow-up: 5-7 weeks as needed    Faculty: Dr. Campbell    Staff Involved:  Resident/Staff    Yesy Araujo MD  Dermatology Resident  [facility]    I have seen and examined this patient and agree with the assessment and plan as documented in the resident's note, and was present for all procedures.    You Campbell MD  Dermatology Attending      ___________________________________________      CC: Derm Problem (Armin is concernd about a lesion on his chest.)      HPI:  Mr. Armin Rizo is a(n) 34 year old male who presents to clinic today as a new patient for skin lesions  - lesion on right chest, present since 2018, may have squeezed some white fluid from it, sometimes minimally itchy, has not grown, maybe a little bit sore something  - has had hot metal applied to abdomen as a child as part of a cultural ritual  - sometimes gets scalp flaking and itching  - gets penile irritation intermittent  - otherwise  Patient passed swallow screen      Iggy Duvall RN  02/06/23 4195 feeling well in usual state of health    Labs/Imaging:  None    Physical exam:  General: In no acute distress, well-developed, well-nourished  Eyes: Conjunctivae clear  Pulmonary: Breathing comfortably in no distress  CV: Well-perfused, no cyanosis  Extremities: No deformity, no edema  Lymphatic: No lymphadenopathy    Skin: Sun-exposed skin, which includes the head/face, neck, both arms, digits, and/or nails was examined.   - skin type: brown  - scar-like heaped exophytic plaque with brown border: right chest  - scar-like nodule: right jackie-areola  - discrete minimally atrophic hypopigmented scar-like circular plaques: abdomen        Medications:  Current Outpatient Medications   Medication     diphenhydrAMINE (BENADRYL) 25 MG tablet     diphenhydrAMINE (BENADRYL) 25 MG tablet     hydrocortisone (ANUSOL-HC) 2.5 % rectal cream     No current facility-administered medications for this visit.       Past Medical History:   Patient Active Problem List   Diagnosis     Hemorrhoids, unspecified hemorrhoid type     No past medical history on file.    CC Samantha Haskins MD  [facility]  [address] [place] [address] on close of this encounter.

## 2023-02-06 NOTE — ED NOTES
[address] to ask about Neuro bed  [address]  connected with [facility] Neurology awaiting call from hospitalist  [id number] [facility].Gaydos Rojelio Lloyd  02/06/23 [phone]

## 2023-02-07 ENCOUNTER — APPOINTMENT (OUTPATIENT)
Dept: MRI IMAGING | Age: 73
DRG: 552 | End: 2023-02-07
Attending: INTERNAL MEDICINE
Payer: MEDICARE

## 2023-02-07 LAB
CHOLESTEROL, TOTAL: 254 MG/DL (ref 0–199)
HCT VFR BLD CALC: 41.8 % (ref 36–48)
HDLC SERPL-MCNC: 42 MG/DL (ref 40–60)
HEMOGLOBIN: 14.2 G/DL (ref 12–16)
LDL CHOLESTEROL CALCULATED: 187 MG/DL
MCH RBC QN AUTO: 32.1 PG (ref 26–34)
MCHC RBC AUTO-ENTMCNC: 34.1 G/DL (ref 31–36)
MCV RBC AUTO: 94.1 FL (ref 80–100)
PDW BLD-RTO: 13.2 % (ref 12.4–15.4)
PLATELET # BLD: 296 K/UL (ref 135–450)
PMV BLD AUTO: 7.4 FL (ref 5–10.5)
RBC # BLD: 4.44 M/UL (ref 4–5.2)
TRIGL SERPL-MCNC: 126 MG/DL (ref 0–150)
VLDLC SERPL CALC-MCNC: 25 MG/DL
WBC # BLD: 6.5 K/UL (ref 4–11)

## 2023-02-07 PROCEDURE — 70553 MRI BRAIN STEM W/O & W/DYE: CPT

## 2023-02-07 PROCEDURE — 99223 1ST HOSP IP/OBS HIGH 75: CPT | Performed by: PSYCHIATRY & NEUROLOGY

## 2023-02-07 PROCEDURE — 97530 THERAPEUTIC ACTIVITIES: CPT

## 2023-02-07 PROCEDURE — A9579 GAD-BASE MR CONTRAST NOS,1ML: HCPCS | Performed by: INTERNAL MEDICINE

## 2023-02-07 PROCEDURE — 36415 COLL VENOUS BLD VENIPUNCTURE: CPT

## 2023-02-07 PROCEDURE — 6370000000 HC RX 637 (ALT 250 FOR IP): Performed by: INTERNAL MEDICINE

## 2023-02-07 PROCEDURE — 97116 GAIT TRAINING THERAPY: CPT

## 2023-02-07 PROCEDURE — APPNB30 APP NON BILLABLE TIME 0-30 MINS: Performed by: NURSE PRACTITIONER

## 2023-02-07 PROCEDURE — 6360000004 HC RX CONTRAST MEDICATION: Performed by: INTERNAL MEDICINE

## 2023-02-07 PROCEDURE — 97165 OT EVAL LOW COMPLEX 30 MIN: CPT

## 2023-02-07 PROCEDURE — 92610 EVALUATE SWALLOWING FUNCTION: CPT

## 2023-02-07 PROCEDURE — 6360000002 HC RX W HCPCS: Performed by: INTERNAL MEDICINE

## 2023-02-07 PROCEDURE — 80061 LIPID PANEL: CPT

## 2023-02-07 PROCEDURE — 85027 COMPLETE CBC AUTOMATED: CPT

## 2023-02-07 PROCEDURE — 1200000000 HC SEMI PRIVATE

## 2023-02-07 PROCEDURE — 97161 PT EVAL LOW COMPLEX 20 MIN: CPT

## 2023-02-07 PROCEDURE — 83036 HEMOGLOBIN GLYCOSYLATED A1C: CPT

## 2023-02-07 RX ORDER — LORAZEPAM 2 MG/ML
2 INJECTION INTRAMUSCULAR ONCE
Status: COMPLETED | OUTPATIENT
Start: 2023-02-07 | End: 2023-02-07

## 2023-02-07 RX ORDER — NIFEDIPINE 60 MG/1
60 TABLET, EXTENDED RELEASE ORAL DAILY
Status: DISCONTINUED | OUTPATIENT
Start: 2023-02-07 | End: 2023-02-08 | Stop reason: HOSPADM

## 2023-02-07 RX ORDER — NIFEDIPINE 60 MG/1
60 TABLET, EXTENDED RELEASE ORAL DAILY
COMMUNITY

## 2023-02-07 RX ORDER — NICOTINE 21 MG/24HR
1 PATCH, TRANSDERMAL 24 HOURS TRANSDERMAL DAILY
Status: DISCONTINUED | OUTPATIENT
Start: 2023-02-07 | End: 2023-02-08 | Stop reason: HOSPADM

## 2023-02-07 RX ORDER — METHOCARBAMOL 500 MG/1
500 TABLET, FILM COATED ORAL EVERY 6 HOURS PRN
Status: DISCONTINUED | OUTPATIENT
Start: 2023-02-07 | End: 2023-02-08 | Stop reason: HOSPADM

## 2023-02-07 RX ADMIN — ENOXAPARIN SODIUM 40 MG: 100 INJECTION SUBCUTANEOUS at 07:57

## 2023-02-07 RX ADMIN — GADOTERIDOL 13 ML: 279.3 INJECTION, SOLUTION INTRAVENOUS at 02:10

## 2023-02-07 RX ADMIN — LORAZEPAM 2 MG: 2 INJECTION INTRAMUSCULAR; INTRAVENOUS at 23:54

## 2023-02-07 RX ADMIN — NIFEDIPINE 60 MG: 60 TABLET, FILM COATED, EXTENDED RELEASE ORAL at 18:27

## 2023-02-07 RX ADMIN — ASPIRIN 81 MG: 81 TABLET, COATED ORAL at 07:57

## 2023-02-07 RX ADMIN — LORAZEPAM 2 MG: 2 INJECTION INTRAMUSCULAR; INTRAVENOUS at 01:41

## 2023-02-07 NOTE — PROGRESS NOTES
Stroke Admission    I agree as the admission nurse that I have completed a thorough stroke assessment and completed the admission on the patient. ALL assessment areas listed below have been addressed and completed. Presentation: TIA    Handoff assessment completed with SCAR Henry. Current NIHSS 1.     [x]   Education Assessment  [x]   Individualized Stroke/TIA Education template added, including patient specific risk factors: Hypertension, Smoking, and Personal history of heart disease  [x]   Individualized Stroke/TIA Care Plan template added  [x]   Bedside swallow screen completed using the [place] Swallow Protocol, and documented PRIOR to any PO meds, food or drink: Pass  [x]   VTE Prophylaxis: SCDs ordered/addressed; SCDs: Off           (As a reminder, ASA, Plavix and TPA are not VTE prophylaxis.)  [x]   Stroke education booklet given, and education initiated with patient and/or caregiver      Nurse eSignature: Electronically signed by Samy Paulson RN on 2/6/23 at 8:43 PM EST

## 2023-02-07 NOTE — PROGRESS NOTES
Occupational Therapy  Facility/Department: [facility] 5T ORTHO/NEURO  Occupational Therapy Initial Assessment/Tx/Discharge    Name: Beth Kohler  : 1950  MRN: [id number]  Date of Service: 2023    Assessment: Pt is at or very near her functional baseline. LUE symptoms have resolved. Pt is independent with ambulation on level surfaces and all ADLs. No acute OT needs identified. Recommend pt continue her outpatient therapy after d/c. Discharge Recommendations: Beth Kohler scored a 24/ on the AM-[facility] ADL Inpatient form. At this time, no further OT is recommended upon discharge. Recommend patient returns to prior setting with prior services. OT Equipment Recommendations  Equipment Needed: No            Assessment   Decision Making: Low Complexity  No Skilled OT: Safe to return home; No OT goals identified  REQUIRES OT FOLLOW-UP: No  Activity Tolerance  Activity Tolerance: Patient Tolerated treatment well  Activity Tolerance Comments: Educated on maintaining activity levels during admit - verb understanding        Plan  D/C OT services        Restrictions  Position Activity Restriction  Other position/activity restrictions: up as tolerated    Subjective   General  Chart Reviewed: Yes  Patient assessed for rehabilitation services?: Yes  Additional Pertinent Hx: 71 y.o. F admitted  with acute on chronic L arm numbness. Code stroke called but no evidence on CT/MRI. Neuro consulted. Cspine MRI ordered. Family / Caregiver Present: No  Referring Practitioner: Adelfo Guevara  Subjective  Subjective: Pt in bed on entry. Pleasant and cooperative.  LUE is back to normal.  General Comment  Comments: No c/o pain     Social/Functional History  Social/Functional History  Lives With: Son, Other (comment) (friend and her daughter)  Type of Home: House  Home Layout: Two level, Able to Live on Main level with bedroom/bathroom, Laundry in basement  Home Access: Stairs to enter with rails  Entrance Stairs - Number of Steps: 2  Bathroom Shower/Tub: Walk-in shower  Bathroom Toilet: Handicap height  Bathroom Equipment: Built-in shower seat, Grab bars in shower  Home Equipment: Grab bars  Has the patient had two or more falls in the past year or any fall with injury in the past year?: No  ADL Assistance: [address] [place] [address] Avenue: Independent  Homemaking Responsibilities: Yes  Ambulation Assistance: Independent  Transfer Assistance: Independent  Active : Yes  Occupation: Full time employment  Type of Occupation: manages horse farm       Objective        Safety Devices  Type of Devices: Call light within reach; Left in bed (no pertinent info to handoff to RN, left in bed as found, pt up ad boris per nursing)  Bed Mobility Training  Bed Mobility Training: Yes  Supine to Sit: Modified independent  Sit to Supine: Modified independent  Balance  Sitting: Intact  Standing: Intact (independent)  Transfer Training  Transfer Training: Yes  Sit to Stand: Independent  Stand to Sit: Independent  Bed to Chair: Independent  Gait  Overall Level of Assistance: Modified independent (in room and around unit. Wavers/sways slightly at times when turning head to talk, but no LOB. Tolerated well.)     Strength:  Within functional limits (5/5 bilaterally with exception of slight weakness to shoulders related to previous injuries)  Coordination: Within functional limits (intact finger to thumb opposition, finger to nose (very slight difference on L, pt reports it was very difficult yesterday but now feels normal); no difficulty with self care fine motor)  Sensation: Intact (per pt report, back to baseline)  ADL  Feeding: Independent  LE Dressing: Independent  LE Dressing Skilled Clinical Factors: pulled up socks in supine     Activity Tolerance  Activity Tolerance: Patient tolerated evaluation without incident        Vision - Basic Assessment  Prior Vision: Wears glasses all the time  Patient Visual Report: No visual complaint reported. Vision  Vision: Impaired  Vision Exceptions: Wears glasses at all times  Hearing  Hearing: Within functional limits  Cognition  Overall Cognitive Status: WNL  Cognition Comment: Pt reports transient period of \"disorientation\" with onset of symptoms. While driving, had difficulty finding her way around her hometown.   Orientation  Overall Orientation Status: Within Normal Limits  Perception  Overall Perceptual Status: WFL           Education Given To: Patient  Education Provided: Role of Therapy;Plan of Care  Education Method: Verbal  Barriers to Learning: None  Education Outcome: Verbalized understanding           AM-PAC Score        AM-[facility] Inpatient Daily Activity Raw Score: 24 (02/07/23 1222)  AM-PAC Inpatient ADL T-Scale Score : 57.54 (02/07/23 1222)  ADL Inpatient CMS 0-100% Score: 0 (02/07/23 1222)  ADL Inpatient CMS G-Code Modifier : CH (02/07/23 1222)       Therapy Time   Individual Concurrent Group Co-treatment   Time In 1159         Time Out 1222         Minutes 23          Timed Code Tx Min: 8  Total Tx Time: [address], OT

## 2023-02-07 NOTE — PROGRESS NOTES
Hospitalist Progress Note      PCP: Jadiel Fishman MD    Date of Admission: 2/6/2023      Subjective: Patient seen and examined  Left arm weakness is improving  No other neurologic deficit      Medications:  Reviewed    Infusion Medications   Scheduled Medications    LORazepam  2 mg IntraVENous Once    atorvastatin  80 mg Oral Nightly    enoxaparin  40 mg SubCUTAneous Daily    aspirin  81 mg Oral Daily    Or    aspirin  300 mg Rectal Daily     PRN Meds: methocarbamol, ondansetron **OR** ondansetron, polyethylene glycol      Intake/Output Summary (Last 24 hours) at 2/7/2023 1600  Last data filed at 2/7/2023 1249  Gross per 24 hour   Intake 480 ml   Output --   Net 480 ml       Physical Exam Performed:    BP (!) 152/80   Pulse 74   Temp 97.8 °F (36.6 °C) (Oral)   Resp 16   Ht 5' 7\" (1.702 m)   Wt 135 lb (61.2 kg)   SpO2 93%   BMI 21.14 kg/m²     General appearance: No apparent distress, appears stated age and cooperative. HEENT: Pupils equal, round, and reactive to light. Conjunctivae/corneas clear. Neck: Supple, with full range of motion. No jugular venous distention. Trachea midline. Respiratory:  Normal respiratory effort. Clear to auscultation, bilaterally without Rales/Wheezes/Rhonchi. Cardiovascular: Regular rate and rhythm with normal S1/S2 without murmurs, rubs or gallops. Abdomen: Soft, non-tender, non-distended with normal bowel sounds. Musculoskeletal: No clubbing, cyanosis or edema bilaterally. Full range of motion without deformity. Skin: Skin color, texture, turgor normal.  No rashes or lesions. Neurologic:  Neurovascularly intact without any focal sensory/motor deficits.  Cranial nerves: II-XII intact, grossly non-focal.  Psychiatric: Alert and oriented, thought content appropriate, normal insight  Capillary Refill: Brisk, 3 seconds, normal   Peripheral Pulses: +2 palpable, equal bilaterally       Labs:   Recent Labs     02/06/23  1422 02/07/23  0728   WBC 9.0 6.5   HGB 16.0 14.2 HCT 46.1 41.8    296     Recent Labs     02/06/23  1422   *   K 3.6   CL 98*   CO2 24   BUN 12   CREATININE 0.7   CALCIUM 8.7     Recent Labs     02/06/23  1422   AST 15   ALT 12   BILITOT 0.3   ALKPHOS 84     Recent Labs     02/06/23  1422   INR 0.92     Recent Labs     02/06/23  1422   TROPONINI <0.01       Urinalysis:    No results found for: Sruthi Grout, BACTERIA, RBCUA, BLOODU, SPECGRAV, GLUCOSEU    Radiology:  MRI BRAIN W WO CONTRAST   Final Result      1. No acute stroke, mass, or hemorrhage. 2. Small remote parenchymal hemorrhage in the right frontal lobe. 3. Moderate chronic small vessel ischemic white matter disease. MRI CERVICAL SPINE WO CONTRAST    (Results Pending)   XR CERVICAL SPINE FLEXION AND EXTENSION    (Results Pending)       IP CONSULT TO NEUROLOGY  IP CONSULT TO NEUROSURGERY    Assessment/Plan:    Active Hospital Problems    Diagnosis     Numbness and tingling [R20.0, R20.2]      Priority: Medium     # Numbness and tingling of left arm  #TIA versus CVA  #Essential hypertension  #Tobacco abuse     PLAN:  -Neurochecks every 4 hourly  -Continue on aspirin 81 mg daily and high intensity statins  -Check A1c and lipid panel in a.m.  -Neurology and neurosurgery consults  -Continue home medications appropriately  -Start on diet as ordered after bedside swallow evaluation  -Counseled regarding smoking cessation, patient already has a nicotine patch  -Work-up per stroke protocol  -Supportive therapy    DVT Prophylaxis: Lovenox  Diet: ADULT DIET; Regular; 4 carb choices (60 gm/meal);  Low Fat/Low Chol/High Fiber/2 gm Na  Code Status: Full Code  PT/OT Eval Status: Ordered    Dispo -inpatient pending improvement    Muna Cole MD

## 2023-02-07 NOTE — CARE COORDINATION
CM Note:   CM met with pt at bedside. Pt is from home with her son and plans to return home with support from her son and friend. Pt's son will provide transportation and pt notes that her son lives 2 hours away and thus advanced notice about discharge time would be helpful. Pt reports she is completely independent pta and reports no HHC, DME or CM needs at discharge. Pt is not a readmission, does not have a CM consult and is low risk of readmission at 6%. CM to sign off, if needs arise during stay please consult CM.   Electronically signed by PADDY Barker on 2/7/2023 at 2:49 PM  [phone]

## 2023-02-07 NOTE — ED NOTES
Report given to strategic. Writer called report to RN taking pt at [facility].       Kira Judge RN  02/06/23 7932

## 2023-02-07 NOTE — PROGRESS NOTES
4 Eyes Admission Assessment     I agree as the admission nurse that 2 RN's have performed a thorough Head to Toe Skin Assessment on the patient. ALL assessment sites listed below have been assessed on admission. Areas assessed by both nurses:   [x]   Head, Face, and Ears   [x]   Shoulders, Back, and Chest  [x]   Arms, Elbows, and Hands   [x]   Coccyx, Sacrum, and Ischium  [x]   Legs, Feet, and Heels        Does the Patient have Skin Breakdown?   No         Aashish Prevention initiated:  No   Wound Care Orders initiated:  No      [facility] nurse consulted for Pressure Injury (Stage 3,4, Unstageable, DTI, NWPT, and Complex wounds) or Aashish score 18 or lower:  No      Nurse 1 eSignature: Electronically signed by Erika Frias RN on 2/6/23 at 8:41 PM EST    **SHARE this note so that the co-signing nurse is able to place an eSignature**    Nurse 2 eSignature: Electronically signed by Sakina Chou RN on 2/7/23 at 3:43 AM EST

## 2023-02-07 NOTE — PROGRESS NOTES
Physical Therapy  Facility/Department: [facility] 5T ORTHO/NEURO  Physical Therapy Initial Assessment and treatment/discharge    Name: Cristobal Suresh  : 1950  MRN: [id number]  Date of Service: 2023    Discharge Recommendations:    Cristobal Suresh scored a 24/24 on the AM-PAC short mobility form. At this time, no further PT is recommended upon discharge due to patient at baseline mobility. Recommend patient returns to prior setting with prior services. PT Equipment Recommendations  Equipment Needed: No      Patient Diagnosis(es): There were no encounter diagnoses. Past Medical History:  has a past medical history of Asthma, COPD (chronic obstructive pulmonary disease) ([facility] [address] Coronary artery calcification seen on CT scan, Degeneration of cervical intervertebral disc, Heart attack ([facility] [address] Hypertension, and Pneumonia. Past Surgical History:  has a past surgical history that includes Tonsillectomy; Appendectomy; Diagnostic Cardiac Cath Lab Procedure (10/2015); Skin graft; other surgical history (N/A, 2017); shoulder surgery; Urethral sling (2022); Upper gastrointestinal endoscopy (N/A, 10/24/2022); and Colonoscopy (N/A, 10/24/2022). Assessment   Assessment: Patient tolerated session well, transferring and ambulating in room and hallways as above with steady gait without an assistive device. Patient able to perform stair training with modified independence and reciprocal pattern ascending and descending. Patient reports plan to d/c home with assistance from son and friend as needed; has no concerns regarding mobility upon discharge. Patient currently functioning at baseline level; no acute PT needs. Will sign off at this time.   Treatment Diagnosis: impaired mobility associated with numbness and tingling  Therapy Prognosis: Good  Decision Making: Low Complexity  Requires PT Follow-Up: No  Activity Tolerance  Activity Tolerance: Patient tolerated evaluation without incident Plan   Physcial Therapy Plan  General Plan: Discharge with evaluation only  Safety Devices  Type of Devices: Left in bed, Call light within reach, Nurse notified (patient up ad boris in room)     Restrictions  Position Activity Restriction  Other position/activity restrictions: up as tolerated     Subjective   Pain: patient denies pain during session  General  Chart Reviewed: Yes  Patient assessed for rehabilitation services?: Yes  Additional Pertinent Hx: Patient is a 70 y/o female admitted 2/6 with LUE numbness/tingling and confusion. MRI brain (+) for Small remote parenchymal hemorrhage in the right frontal lobe. Response To Previous Treatment: Not applicable  Family / Caregiver Present: No  Referring Practitioner: Katie Lynn MD  Referral Date : 02/06/23  Diagnosis: numbness and tingling  Follows Commands: Within Functional Limits  General Comment  Comments: Patient supine in bed upon arrival.  Subjective  Subjective: Patient agreeable to PT evaluation.          Social/Functional History  Social/Functional History  Lives With: Son, Other (comment) (friend and her daughter)  Type of Home: House  Home Layout: Two level, Able to Live on Main level with bedroom/bathroom, Laundry in basement  Home Access: Stairs to enter with rails  Entrance Stairs - Number of Steps: 2  Bathroom Shower/Tub: Walk-in shower  Bathroom Toilet: Handicap height  Bathroom Equipment: Built-in shower seat, Grab bars in [address] [place]: Grab bars  Has the patient had two or more falls in the past year or any fall with injury in the past year?: No  ADL Assistance: Independent  Homemaking Assistance: Independent  Homemaking Responsibilities: Yes  Ambulation Assistance: Independent  Transfer Assistance: Independent  Active : Yes  Type of Occupation: manages horse farm  Vision/Hearing  Vision  Vision: Impaired  Vision Exceptions: Wears glasses at all times  Hearing  Hearing: Within functional limits    Cognition Orientation  Overall Orientation Status: Within Normal Limits  Cognition  Overall Cognitive Status: WFL     Objective           AROM RLE (degrees)  RLE AROM: WFL  AROM LLE (degrees)  LLE AROM : WFL  Strength RLE  Strength RLE: WFL  Strength LLE  Strength LLE: WFL           Bed mobility  Supine to Sit: Independent  Sit to Supine: Independent  Transfers  Sit to Stand: Modified independent (from EOB)  Stand to Sit: Modified independent (to EOB)  Ambulation  Surface: Level tile  Device: No Device  Assistance: Independent  Quality of Gait: steady gait with no loss of balance noted, remy and mechanics WFL  Distance: 350' in room and hallways returning to bedside chair  More Ambulation?: No  Stairs/Curb  Stairs?: Yes  Stairs  # Steps : 25  Stairs Height: 6\"  Rails: Left ascending  Device: No Device  Assistance: Modified independent   Comment: reciprocal pattern ascending and descending, steady gait on stairs     Balance  Sitting - Static: Good  Sitting - Dynamic: Good  Standing - Static: Good  Standing - Dynamic: Good           OutComes Score                                                  AM-PAC Score  AM-PAC Inpatient Mobility Raw Score : 24 (02/07/23 1016)  AM-PAC Inpatient T-Scale Score : 61.14 (02/07/23 1016)  Mobility Inpatient CMS 0-100% Score: 0 (02/07/23 1016)  Mobility Inpatient CMS G-Code Modifier : CH (02/07/23 1016)          Tinneti Score       Goals  Short Term Goals  Time Frame for Short Term Goals: none; patient will be discharged from acute PT  Patient Goals   Patient Goals : to go home       Education  Patient Education  Education Given To: Patient  Education Provided: Role of Therapy;Plan of Care  Education Method: Verbal  Barriers to Learning: None  Education Outcome: Verbalized understanding;Demonstrated understanding      Therapy Time   Individual Concurrent Group Co-treatment   Time In 0945         Time Out 1009         Minutes 24         Timed Code Treatment Minutes: 9 Minutes     Timed Code Treatment Minutes:  9 Minutes    Total Treatment Minutes:    9 minutes treatment + 15 minutes evaluation = 24 total treatment minutes  [place], [place] [id number]

## 2023-02-07 NOTE — H&P
Hospital Medicine History & Physical      PCP: Rochell Kayser, MD    Date of Admission: 2/6/2023    Date of Service: Pt seen/examined on 2/6/2023 and Admitted to Inpatient with expected LOS greater than two midnights due to medical therapy. Chief Complaint: Left arm numbness      History Of Present Illness:    71 y.o. female who was transferred here from outside hospital for focal neurological deficit and neurology consult. Patient states that she started feeling left arm numbness around 9:30 AM today. These symptoms are intermittent and can vary from 30 seconds to a couple of hours in duration. Patient has been getting these episodes intermittently over the past year. Patient states it comes as tingling in the left arm and it goes numb and feels like her arm is not attached to the shoulder. Left arm gets limp and gets out of control. Denies any other focal neurological symptoms to include headache, changes in vision/speech, other focal motor or sensory deficits, unsteady gait, ataxia. When the symptoms last for couple of hours patient feels dizzy and lightheaded. 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 Has had neck surgery about 5 years ago and has not had the symptoms prior to the surgery or after the surgery until 1 year ago. Patient is an active smoker, started smoking as a teenager and smokes more than 1 pack/day for the past 2 years    Patient is adopted and unable to provide any family history. Her biological sister is diagnosed with type II DM    Currently patient is completely asymptomatic and states that it recur.     Past Medical History:          Diagnosis Date    Asthma     COPD (chronic obstructive pulmonary disease) ([facility] [address] 2015    mild    Coronary artery calcification seen on CT scan 2015    Degeneration of cervical intervertebral disc 9/13/2016    Heart attack [facility])     Hypertension     Pneumonia        Past Surgical History:          Procedure Laterality Date    APPENDECTOMY      COLONOSCOPY N/A 10/24/2022    COLON W/ANES. performed by Nikole Granda DO at [address] [facility] CATH LAB PROCEDURE  10/2015    OTHER SURGICAL HISTORY N/A 04/07/2017    POSTERIOR CERVICAL LAMINOFORAMINOTOMY C3/4, C4/5, AND C5/6    SHOULDER SURGERY      SKIN GRAFT      TONSILLECTOMY      UPPER GASTROINTESTINAL ENDOSCOPY N/A 10/24/2022    EGD BIOPSY performed by Nikole Granda DO at [address] [facility]  02/03/2022       Medications Prior to Admission:      Prior to Admission medications    Medication Sig Start Date End Date Taking? Authorizing Provider   spironolactone (ALDACTONE) 25 MG tablet Take 25 mg by mouth daily    Historical Provider, MD   Budeson-Glycopyrrol-Formoterol (BREZTRI AEROSPHERE) 160-9-4.8 MCG/ACT AERO Inhale 2 puffs into the lungs 2 times daily 9/6/22   Gray Prince MD   atorvastatin (LIPITOR) 20 MG tablet  1/17/22   Historical Provider, MD   albuterol sulfate HFA (PROVENTIL HFA) 108 (90 Base) MCG/ACT inhaler INHALE 2 PUFFS BY MOUTH EVERY SIX HOURS AS NEEDED 8/18/21   Shwetha Pena MD   albuterol (PROVENTIL) (2.5 MG/3ML) 0.083% nebulizer solution Take 3 mLs by nebulization every 6 hours as needed for Wheezing or Shortness of Breath 8/18/21   Shwetha Pena MD   Ergocalciferol (VITAMIN D2 PO) Take 1.25 mg by mouth once a week     Historical Provider, MD   omeprazole (PRILOSEC) 20 MG capsule Take 40 mg by mouth daily     Historical Provider, MD       Allergies:  Patient has no known allergies. Social History:      The patient currently lives at home    TOBACCO:   reports that she has been smoking cigarettes. She started smoking about 56 years ago. She has a 50.00 pack-year smoking history. She has never used smokeless tobacco.  ETOH:   reports current alcohol use.   E-cigarette/Vaping       Questions Responses E-cigarette/Vaping Use Never User    Start Date     Passive Exposure     Quit Date     Counseling Given     Comments               Family History:    Reviewed and negative in regards to presenting illness/complaint. Adopted: Yes   Problem Relation Age of Onset    No Known Problems Mother     No Known Problems Father        REVIEW OF SYSTEMS COMPLETED:   Pertinent positives as noted in the HPI. All other systems reviewed and negative. PHYSICAL EXAM PERFORMED:    BP (!) 156/78   Pulse 71   Temp 97.7 °F (36.5 °C) (Oral)   Resp 16   SpO2 94%     General appearance:  No apparent distress, appears stated age and cooperative. HEENT:  Normal cephalic, atraumatic without obvious deformity. Pupils equal, round, and reactive to light. Extra ocular muscles intact. Conjunctivae/corneas clear. Neck: Supple, with full range of motion. No jugular venous distention. Trachea midline. Respiratory:  Normal respiratory effort. Clear to auscultation, bilaterally without Rales/Wheezes/Rhonchi. Cardiovascular:  Regular rate and rhythm with normal S1/S2 without murmurs, rubs or gallops. Abdomen: Soft, non-tender, non-distended with normal bowel sounds. Musculoskeletal:  No clubbing, cyanosis or edema bilaterally. Full range of motion without deformity. Tenderness in cervical spine on palpation. ROM-not restricted  Skin: Skin color, texture, turgor normal.  No rashes or lesions. Neurologic:  Neurovascularly intact without any focal sensory/motor deficits.  Cranial nerves: II-XII intact, grossly non-focal.  Motor 5/5 in all 4 extremities, sensory-intact in all 4 extremities, gait-steady, no cerebellar signs, no nystagmus  Psychiatric:  Alert and oriented, thought content appropriate, normal insight  Capillary Refill: Brisk,3 seconds, normal  Peripheral Pulses: +2 palpable, equal bilaterally       Labs:     Recent Labs     02/06/23  1422   WBC 9.0   HGB 16.0   HCT 46.1          Recent Labs     02/06/23  1422   NA 133*   K 3.6   CL 98*   CO2 24   BUN 12   CREATININE 0.7   CALCIUM 8.7       Recent Labs     02/06/23  1422   AST 15   ALT 12   BILITOT 0.3   ALKPHOS 84       Recent Labs     02/06/23  1422   INR 0.92       Recent Labs     02/06/23  1422   TROPONINI <0.01         Urinalysis:    No results found for: Rhea Mcgowan, [address], RBCUA, BLOODU, [place]on [place], [address]    Radiology:     CXR: I have reviewed the CXR with the following interpretation: No acute airspace disease  EKG:  I have reviewed the EKG with the following interpretation: Sinus rhythm, VR = 87, QTc = 447, no acute ST-T changes    CT head without contrast  No noncontrast CT evidence of acute intracranial pathology. Cortical   atrophy, subcortical white matter changes consistent with microvascular   degenerative disease incidentally noted. Findings consistent with focal mild chronic inflammatory sinus disease right   side of septated sphenoid sinus and posterior right ethmoid air cells. CTA head and neck with contrast  No flow limiting stenosis or occlusion within the head or neck. 2 mm dorsally projecting conical outpouching involving the left ICA terminus,   either reflecting a small aneurysm or infundibulum associated with the   anterior choroidal artery.      MRI brain with contrast    (Results Pending)       Consults:    IP CONSULT TO NEUROLOGY    ASSESSMENT:    Active Hospital Problems    Diagnosis Date Noted    Numbness and tingling [R20.0, R20.2] 02/06/2023     Priority: Medium   #TIA versus CVA  #Essential hypertension  #Tobacco abuse    PLAN:  -Neurochecks every 4 hourly  -Continue on aspirin 81 mg daily and high intensity statins  -Check A1c and lipid panel in a.m.  -Neurology and neurosurgery consults  -Continue home medications appropriately  -Start on diet as ordered after bedside swallow evaluation  -Counseled regarding smoking cessation, patient already has a nicotine patch  -Work-up per stroke protocol  -Supportive therapy      DVT Prophylaxis: Lovenox  Diet: Diet NPO  Code Status: Full Code    PT/OT Eval Status: As tolerated with fall precautions    Dispo -GMF with telemetry       Yanteh Gross MD    Thank you Manual MD Román for the opportunity to be involved in this patient's care. If you have any questions or concerns please feel free to contact me at [phone].

## 2023-02-07 NOTE — ED NOTES
Report given to Netero.  Handoff [facility] completed     Sharlene Berger RN  02/06/23 0008

## 2023-02-07 NOTE — CONSULTS
NEUROSURGERY CONSULT NOTE    Hany Munoz  [id number]   1950 2/7/2023    Requesting physician: Trevor Venegas MD    Reason for consultation: Abnormal CTA Head/Neck    History of present illness: Patient is a 71 y.o. female that  has a past medical history of Asthma, COPD (chronic obstructive pulmonary disease) ([facility] [address] (2015), Coronary artery calcification seen on CT scan (2015), Degeneration of cervical intervertebral disc (9/13/2016), s/p Right C3/4 C4/5 C5/6 POSTERIOR CERVICAL MIN INVASIVE LAMINOFORAMINOTOMY  by Dr. Spencer Palacio on 4/7/2017, Heart attack ([facility] [address] Hypertension, and Pneumonia. Patient [facility] c/o left arm numbness/tingling and confusion. Patient states she has been having intermittent episodes of left arm numbness over the past year. Patient does have a history of right arm issues caused by severe foraminal stenosis that eventually led to a surgical intervention for relief. ROS:   GENERAL:  Denies fever or recent illness. Denies weight changes   EYES:  Denies vision change or diplopia  EARS:  Denies hearing loss  CARDIAC:  Denies chest pain  RESPIRATORY:  Denies shortness of breath  SKIN:  Denies rash or lesions   HEM:  Denies excessive bruising  PSYCH:  Denies anxiety or depression  NEURO: Intermittent Left Arm numbness; Denies headache, numbness or tingling or lateralizing weakness   :  Denies urinary difficulty  GI: Denies nausea, vomiting, diarrhea or constipation  MUSCULOSKELETAL: Endorses neck pain    No Known Allergies    Past Medical History:   Diagnosis Date    Asthma     COPD (chronic obstructive pulmonary disease) ([facility] [address] 2015    mild    Coronary artery calcification seen on CT scan 2015    Degeneration of cervical intervertebral disc 9/13/2016    Heart attack ([facility] [address]     Hypertension     Pneumonia         Past Surgical History:   Procedure Laterality Date    APPENDECTOMY      COLONOSCOPY N/A 10/24/2022    COLON W/ANES.  performed by Irina Emanuel Dariela Almonte DO at [facility] CATH LAB PROCEDURE  10/2015    OTHER SURGICAL HISTORY N/A 04/07/2017    POSTERIOR CERVICAL LAMINOFORAMINOTOMY C3/4, C4/5, AND C5/6    SHOULDER SURGERY      SKIN GRAFT      TONSILLECTOMY      UPPER GASTROINTESTINAL ENDOSCOPY N/A 10/24/2022    EGD BIOPSY performed by Deja Mccarthy DO at [address]  02/03/2022       Social History     Occupational History    Not on file   Tobacco Use    Smoking status: Every Day     Packs/day: 1.00     Years: 50.00     Pack years: 50.00     Types: Cigarettes     Start date: 4/23/1966    Smokeless tobacco: Never    Tobacco comments:     09/06/2022 Less than 1 PPD    Vaping Use    Vaping Use: Never used   Substance and Sexual Activity    Alcohol use:  Yes     Alcohol/week: 0.0 standard drinks     Comment: occassional    Drug use: No    Sexual activity: Yes     Partners: Male        Family History   Adopted: Yes   Problem Relation Age of Onset    No Known Problems Mother     No Known Problems Father         Outpatient Medications Marked as Taking for the 2/6/23 encounter [facility] Encounter)   Medication Sig Dispense Refill    NIFEdipine (PROCARDIA XL) 60 MG extended release tablet Take 60 mg by mouth daily          Current Facility-Administered Medications   Medication Dose Route Frequency Provider Last Rate Last Admin    atorvastatin (LIPITOR) tablet 80 mg  80 mg Oral Nightly Linda Redman MD        ondansetron (ZOFRAN-ODT) disintegrating tablet 4 mg  4 mg Oral Q8H PRN Linda Redman MD        Or    ondansetron (ZOFRAN) injection 4 mg  4 mg IntraVENous Q6H PRN Kianna De La Fuente MD        polyethylene glycol (GLYCOLAX) packet 17 g  17 g Oral Daily PRN Kianna Rogers MD        enoxaparin (LOVENOX) injection 40 mg  40 mg SubCUTAneous Daily Linda Redman MD   40 mg at 02/07/23 0757    aspirin EC tablet 81 mg  81 mg Oral Daily Kianna Rogers MD   81 mg at 02/07/23 0757    Or    aspirin suppository 300 mg  300 mg Rectal Daily Kianna Lynn MD            Objective:  BP (!) 151/82   Pulse 76   Temp 97.1 °F (36.2 °C) (Infrared)   Resp 16   Ht 5' 7\" (1.702 m)   Wt 135 lb (61.2 kg)   SpO2 93%   BMI 21.14 kg/m²     Physical Exam:   Patient seen and examined  GCS:  4 - Opens eyes on own  5 - Alert and oriented  6 - Follows simple motor commands  General: Well developed. Alert and cooperative in no acute distress. HENT: atraumatic, neck supple  Eyes: Optic discs: Not tested  Pulmonary: unlabored respiratory effort  Cardiovascular:  Warm well perfused. No peripheral edema  Gastrointestinal: abdomen soft, NT, ND    Neurological:  Mental Status: Awake, alert, oriented x 4, speech clear and appropriate  Attention: Intact  Language: No aphasia or dysarthria noted  Sensation: Intact to all extremities to light touch, except left arm numbness  Coordination: Intact    Cranial Nerves:  II: Visual acuity not tested, denies new visual changes / diplopia  III, IV, VI: PERRL, 3 mm bilaterally, EOMI, no nystagmus noted  V: Facial sensation intact bilaterally to touch  VII: Face symmetric  VIII: Hearing intact bilaterally to spoken voice  IX: Palate movement equal bilaterally  XI: Shoulder shrug equal bilaterally  XII: Tongue midline    Musculoskeletal:   Gait: Not tested   Assist devices: None   Tone: Normal  Motor strength:    Right  Left    Right  Left    Deltoid  5 5  Hip Flex  5 5   Biceps  5 5  Knee Extensors  5 5   Triceps  5 5  Knee Flexors  5 5   Wrist Ext  5 5  Ankle Dorsiflex. 5 5   Wrist Flex  5 5  Ankle Plantarflex. 5 5   Handgrip  5 5  Ext Chema Longus  5 5   Thumb Ext  5 5         Radiological Findings:  CT HEAD WO CONTRAST  Result Date: 2/6/2023  No noncontrast CT evidence of acute intracranial pathology. Cortical atrophy, subcortical white matter changes consistent with microvascular degenerative disease incidentally noted.  Findings consistent with focal mild chronic inflammatory sinus disease right side of septated sphenoid sinus and posterior right ethmoid air cells. CTA HEAD NECK W CONTRAST  Result Date: 2/6/2023  No flow limiting stenosis or occlusion within the head or neck. 2 mm dorsally projecting conical outpouching involving the left ICA terminus, either reflecting a small aneurysm or infundibulum associated with the anterior choroidal artery. MRI BRAIN W WO CONTRAST  Result Date: 2/7/2023  1. No acute stroke, mass, or hemorrhage. 2. Small remote parenchymal hemorrhage in the right frontal lobe. 3. Moderate chronic small vessel ischemic white matter disease. Labs:  Recent Labs     02/07/23  0728   WBC 6.5   HGB 14.2   HCT 41.8          Recent Labs     02/06/23  1422 02/06/23  1457   *  --    K 3.6  --    CL 98*  --    CO2 24  --    BUN 12  --    CREATININE 0.7  --    GLUCOSE 131* 132   CALCIUM 8.7  --        Recent Labs     02/06/23  1422   PROTIME 12.2   INR 0.92       Patient Active Problem List    Diagnosis Date Noted    Numbness and tingling 02/06/2023    Degeneration of cervical intervertebral disc 09/13/2016    Cervical radiculopathy, chronic 09/13/2016    Displacement of cervical intervertebral disc without myelopathy 09/13/2016    Cervical spondylosis without myelopathy 09/13/2016    Asthma with COPD ([address] 11/23/2015    Shortness of breath 11/23/2015    Tobacco abuse 11/23/2015    GERD (gastroesophageal reflux disease) 2015    Coronary artery calcification seen on CT scan 2015    Centrilobular emphysema ([address] 2015       Assessment:  Patient is a 71 y.o. female w/left arm numbness that has been intermittently occurring over the past year.     Plan:  No emergent neurosurgical intervention indicated  Neurologic exams frequency: Q4H  For change in exam MUST contact neurosurgery team along with critical care or primary team  Left Arm Numbness:  - CTA Head/Neck shows 2 mm dorsally projecting conical outpouching involving the left ICA terminus, either reflecting a small aneurysm or infundibulum. Incidental finding and not the source of LUE numbness, but should follow up with Dr Nery Lindsay in 4-6 weeks  - MRI Cervical to evaluate etiology of LUE numbness  - XR Cervical Flex/ex to r/o instability  - Robaxin for muscle spasms  Pain control: Managed by medical team  Advance diet / activity per primary team  Thank you for consult. Will follow inpatient. Please call with any questions or decline in neurological status    DISPO: Remain inpatient from neurosurgery standpoint. Dispo timing to be determined by primary team once patient is medically stable for discharge. Patient was discussed with Dr. Harshal Taylor who agrees with above assessment and plan. Electronically signed by: TERESITA Reyes CNP, APRN-CNP, 2/7/2023 9:34 AM  [phone]    I spent 30 minutes in the care of this patient.   Over 50% of that time was in face-to-face counseling regarding disease process, diagnostic testing, preventative measures, and answering patient and family questions

## 2023-02-07 NOTE — CONSULTS
Neurology / Neurocritical Care Consult Note      Stevo Dodd MD is requesting this consult. Reason for Consult: Left arm numbness, confusion. Admission Chief Complaint:  Left arm numbness and disorientation. History of Present Illness     Vitaly Zeng is a 71 y.o. 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 The patient reports that she has experienced numbness of either side of the left hand occasionally that last for a few seconds to 5 minutes but has never experienced her entire left arm getting numb. She says that she was driving to get physical therapy for her right arm when she noticed her left arm getting numb followed by left arm weakness. Afterwards she pulled into a drive-thru car wash but reports feeling disoriented  and dizzy and not knowing where to turn. That is when she decided to go to the nearest hospital.  CT Scan of the head was unremarkable, CTA did not reveal any LVO. MRI of the brain did not reveal acute changes however found a small remote parenchymal hemorrhage in the frontal lobe. Per my interview with the patient, she reports feeling at baseline health. She denies any chest pain, SOB0. No motor or sensory deficits. History provided by:  Patient. REVIEW OF SYSTEMS:   Constitutional- No weight loss or fevers   Neurologic- No motor or sensory deficits.      Past Medical, Surgical, Family, and Social History   PAST MEDICAL HISTORY:  Past Medical History:   Diagnosis Date    Asthma     COPD (chronic obstructive pulmonary disease) ([address] 2015    mild    Coronary artery calcification seen on CT scan 2015    Degeneration of cervical intervertebral disc 9/13/2016    Heart attack ([address]     Hypertension     Pneumonia      SURGICAL HISTORY:  Past Surgical History:   Procedure Laterality Date    APPENDECTOMY      COLONOSCOPY N/A 10/24/2022 COLON W/ANES. performed by Carol Schaefer DO at [address] CATH LAB PROCEDURE  10/2015    OTHER SURGICAL HISTORY N/A 04/07/2017    POSTERIOR CERVICAL LAMINOFORAMINOTOMY C3/4, C4/5, AND C5/6    SHOULDER SURGERY      SKIN GRAFT      TONSILLECTOMY      UPPER GASTROINTESTINAL ENDOSCOPY N/A 10/24/2022    EGD BIOPSY performed by Carol Schaefer DO at [address]  02/03/2022     FAMILY HISTORY & SOCIAL HISTORY:  Family history non-contributory  Family History   Adopted: Yes   Problem Relation Age of Onset    No Known Problems Mother     No Known Problems Father      Social History     Tobacco Use    Smoking status: Every Day     Packs/day: 1.00     Years: 50.00     Pack years: 50.00     Types: Cigarettes     Start date: 4/23/1966    Smokeless tobacco: Never    Tobacco comments:     09/06/2022 Less than 1 PPD    Vaping Use    Vaping Use: Never used   Substance Use Topics    Alcohol use:  Yes     Alcohol/week: 0.0 standard drinks     Comment: occassional    Drug use: No         Allergies & Outpatient Medications   ALLERGIES:  No Known Allergies  HOME MEDICATIONS:  Current Discharge Medication List        CONTINUE these medications which have NOT CHANGED    Details   NIFEdipine (PROCARDIA XL) 60 MG extended release tablet Take 60 mg by mouth daily      spironolactone (ALDACTONE) 25 MG tablet Take 25 mg by mouth daily      Budeson-Glycopyrrol-Formoterol (BREZTRI AEROSPHERE) 160-9-4.8 MCG/ACT AERO Inhale 2 puffs into the lungs 2 times daily  Qty: 4 each, Refills: 0    Comments: Lot [id number]  Exp 10/24      atorvastatin (LIPITOR) 20 MG tablet       albuterol sulfate HFA (PROVENTIL HFA) 108 (90 Base) MCG/ACT inhaler INHALE 2 PUFFS BY MOUTH EVERY SIX HOURS AS NEEDED  Qty: 6.7 g, Refills: 5      albuterol (PROVENTIL) (2.5 MG/3ML) 0.083% nebulizer solution Take 3 mLs by nebulization every 6 hours as needed for Wheezing or Shortness of Breath  Qty: 120 each, Refills: 5    Associated Diagnoses: Asthma with COPD ([facility]      Ergocalciferol (VITAMIN D2 PO) Take 1.25 mg by mouth once a week       omeprazole (PRILOSEC) 20 MG capsule Take 40 mg by mouth daily                Physical Exam   PHYSICAL EXAM:  Vitals:    02/06/23 2316 02/06/23 2357 02/07/23 0325 02/07/23 0649   BP: (!) 160/75  135/78 (!) 151/82   Pulse: 65  67 76   Resp: 16  16 16   Temp: 97.6 °F (36.4 °C)  97.5 °F (36.4 °C) 97.1 °F (36.2 °C)   TempSrc: Oral  Oral Infrared   SpO2: 94%  94% 93%   Weight:  135 lb (61.2 kg)     Height:  5' 7\" (1.702 m)           General: Alert, no distress, well-nourished  Neurologic  Mental status:   Orientation: to person, place, time, situation   Attention: intact as able to attend well to the exam     Language: fluent in conversation   Comprehension intact; follows simple commands    Cranial nerves:   CN2: Visual fields full w/o extinction on confrontational testing   CN 3,4,6: Pupils equal and reactive to light, extraocular muscles intact  CN5: Facial sensation symmetric   CN7: Face symmetric  CN8: Hearing symmetric to spoken voice  CN9: Palate elevated symmetrically  CN11: Traps full strength on shoulder shrug  CN12: Tongue midline with protrusion    Motor Exam:   R  L    Deltoid 4  4   Biceps 4 4   Triceps 4 4   Wrist extension  4 4   Interossei 4 4      R  L    Hip flexion  5  5   Hip extension  5 5   Knee flexion  5 5   Knee extension  5 5   Ankle dorsiflexion  5 5   Ankle plantar flexion  5 5       Sensory: light touch intact and symmetric in all 4 extremities. No sensory extinction on bilateral simultaneous stimulation  Cerebellar/coordination: finger nose finger normal without ataxia  Tone: normal in all 4 extremities    Diagnostic Testing Results   IMAGES:  Images personally reviewed and agree w/ radiology interpretation. Head CT w/o Contrast:  Impression   No noncontrast CT evidence of acute intracranial pathology.   Cortical   atrophy, subcortical white matter changes consistent with microvascular   degenerative disease incidentally noted. Findings consistent with focal mild chronic inflammatory sinus disease right   side of septated sphenoid sinus and posterior right ethmoid air cells. CTA of Head / Neck w/ Contrast:  Impression   No flow limiting stenosis or occlusion within the head or neck. 2 mm dorsally projecting conical outpouching involving the left ICA terminus,   either reflecting a small aneurysm or infundibulum associated with the   anterior choroidal artery. MRI Brain w/o Contrast:  Impression   1. No acute stroke, mass, or hemorrhage. 2. Small remote parenchymal hemorrhage in the right frontal lobe. 3. Moderate chronic small vessel ischemic white matter disease. LABS:  All results below personally reviewed. Pertinent positives & negatives are addressed in Impression & Recommendations below. LABS   Metabolic Panel Recent Labs     02/06/23  1422 02/06/23  1457   *  --    K 3.6  --    CL 98*  --    CO2 24  --    BUN 12  --    CREATININE 0.7  --    GLUCOSE 131* 132   CALCIUM 8.7  --    LABALBU 4.3  --    ALKPHOS 84  --    ALT 12  --    AST 15  --       CBC / Coags Recent Labs     02/06/23  1422 02/07/23  0728   WBC 9.0 6.5   RBC 4.92 4.44   HGB 16.0 14.2   HCT 46.1 41.8    296   INR 0.92  --       Other No results for input(s): LABA1C, LDLCALC, TRIG, TSH, [id number], FOLATE, LABSALI, COVID19 in the last 72 hours. No results for input(s): PHENYTOIN, KEPPRA, LACOSA, LAMO, VALPROATE, LACTSEPSIS, LACTA in the last 72 hours. CURRENT SCHEDULED MEDICATIONS   Inpatient Medications     atorvastatin, 80 mg, Oral, Nightly    enoxaparin, 40 mg, SubCUTAneous, Daily    aspirin, 81 mg, Oral, Daily **OR** aspirin, 300 mg, Rectal, Daily   Infusions      Antibiotics   Recent Abx Admin        No antibiotic orders with administrations found.                        IMPRESSION & RECOMMENDATIONS     IMPRESSION:  Numbness of the left upper extremity that has been intermittent. No acute changes on the CT Scan, no LVO on the CTA/   MRI Brain wo contrast revealing no acute infarct. 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 RECOMMENDATIONS:  It is still unclear if the patient had a TIA but stroke is ruled out. Primary prevention including blood pressure management, statin and continuing ASA is appropriate. We talked in length about patient having body aches with being on statin earlier. Recommend that she titrate the dosage outpatient based on her symptoms of body aches. Awaiting MRI of the cervical spine results. Management and plan discussed with:   MD Ryley Zarate MD   PGY-2 Internal Medicine.    Neurology & Neurocritical Care   2/7/2023 10:41 AM

## 2023-02-07 NOTE — PROGRESS NOTES
Speech Language Pathology  Facility/Department:[facility] 5T ORTHO/NEURO  Evaluation  Discharge                                                          Patient Diagnosis(es):   Patient Active Problem List    Diagnosis Date Noted    Numbness and tingling 02/06/2023    Degeneration of cervical intervertebral disc 09/13/2016    Cervical radiculopathy, chronic 09/13/2016    Displacement of cervical intervertebral disc without myelopathy 09/13/2016    Cervical spondylosis without myelopathy 09/13/2016    Asthma with COPD ([address] 11/23/2015    Shortness of breath 11/23/2015    Tobacco abuse 11/23/2015    GERD (gastroesophageal reflux disease) 2015    Coronary artery calcification seen on CT scan 2015    Centrilobular emphysema ([address] 2015       Past Medical History:   Diagnosis Date    Asthma     COPD (chronic obstructive pulmonary disease) ([address] 2015    mild    Coronary artery calcification seen on CT scan 2015    Degeneration of cervical intervertebral disc 9/13/2016    Heart attack ([address]     Hypertension     Pneumonia      Past Surgical History:   Procedure Laterality Date    APPENDECTOMY      COLONOSCOPY N/A 10/24/2022    COLON W/ANES. performed by Apple De Anda DO at [address] [facility] CATH LAB PROCEDURE  10/2015    OTHER SURGICAL HISTORY N/A 04/07/2017    POSTERIOR CERVICAL LAMINOFORAMINOTOMY C3/4, C4/5, AND C5/6    SHOULDER SURGERY      SKIN GRAFT      TONSILLECTOMY      UPPER GASTROINTESTINAL ENDOSCOPY N/A 10/24/2022    EGD BIOPSY performed by Apple De Anda DO at [address] [facility]  02/03/2022       Reason for Referral:  Eduardo Stanley  was referred for a Speech Therapy evaluation to assess swallow function and/or communication. History of Present Illness  Eduardo Stanley is a 71 y.o. female  who presents to the ED complaining of left arm numbness/tingling and confusion.   Patient states that she was on her way to physical therapy which start developing numbness and tingling in the ulnar aspect of her left arm around 930 (5 hrs PTA). Shortly after this she developed numbness and tingling throughout the entire left upper extremity. She finished her physical therapy. Afterwards she was driving to a car wash when she got lost in an area that she is normally familiar with. On arrival she is no longer feeling disoriented, but the numbness and tingling have persisted. Denies any headache, nausea, vomiting, chest pain, shortness of breath. Notes that the arm sensation feels similar to when she had \"a silent heart attack. \"    CXR (02/06/2023) : Impression   Radiographically nonacute portable chest.      CT head (02/06/2023): Impression   No noncontrast CT evidence of acute intracranial pathology. Cortical   atrophy, subcortical white matter changes consistent with microvascular   degenerative disease incidentally noted. Findings consistent with focal mild chronic inflammatory sinus disease right   side of septated sphenoid sinus and posterior right ethmoid air cells. MRI Brain (02/06/2023): Impression       1. No acute stroke, mass, or hemorrhage. 2. Small remote parenchymal hemorrhage in the right frontal lobe. 3. Moderate chronic small vessel ischemic white matter disease. Date of onset: 02/06/2023    Current Diet:  ADULT DIET; Regular; 4 carb choices (60 gm/meal); Low Fat/Low Chol/High Fiber/2 gm Na    Treatment Diagnosis: N/A     Pain:  None indicated at time of visit     General:  Chart reviewed: Yes  Behavior/Cognition: Alert, Cooperative, Pleasant mood    Communication Observation: Functional   Follows Directions: Simple Commands   Dentition/Oral Mucosa: Adequate - missing some teeth  Oral motor exam: WNL  Respiratory Status/oxygen requirements: Room Air   Vision/Hearing: Vision: Impaired, wears glasses at all times, Hearing:  WNL  Patient Complaint: None stated at time of visit   Patient Positioning: Upright in bed Prior Dysphagia History:   No prior documentation noted in chart    Bedside Swallowing Evaluation Impression 2/7/2023  Pt was alert and upright in bed. Pt orientated x4. OME WFL with no facial asymmetry noted. Pt exhibited some missing dentition but adequate. Volitional cough, swallow and throat clear appeared [facility] [place]. Pt stated that she experiences globus sensation with certain items at home, such as solids and hot sauce. Pt also reported instances of difficulty swallowing saliva while lying down in bed and is able to remedy problem by sitting up and then swallowing. Pt analyzed with ice chips, water by cup and straw, applesauce, cracker and medication with water. Pt demonstrated appropriate labial seal with cup, straw, and spoon. Pt observed with adequate mastication when presented ice chip, puree and cracker. No oral residue or anterior spillage noted during PO trials. Swallow movement noted with all consistencies. No coughing, throat clearing or change in voice noted with any consistency. Pt able to consume 3oz water continuously with no overt s/s of aspiration. Recommend continue with regular diet. Speech/language/cognitive screen 2/7/2023  Pt was alert and oriented x3. Speech is clear with no instances of word finding difficulty. Pt able to follow commands and respond to questions appropriately. Education  Pt educated to role of SLP, purpose of the visit, and rationale for diet recommendation and strategies. Pt stated comprehension. Prognosis:  Prognosis for improvement: N/A  Barriers to reach goals: N/A  Consulted and agree with results and recommendations: Pt and RN    Treatment:  N/A    Plan/Therapy recommendations:  Swallowing and communication appear to be [facility].  Pt discharged from Speech Therapy services  Recommended diet: Continue with regular diet with thin liquids -Make NPO if s/s aspiration emerge and alert SLP  Recommended form of medication: PO  Swallowing Strategies: Sit upright 90 degrees, alternate sips/bites   Pt goal:None stated at time of visit   Pt discharge goal: To home   Total treatment time: 20 dx  Discharge Recommendation: No further speech therapy indicated unless there is a change in status, then please re-refer  Discussed with RN, Kristyn Corey   Needs met prior to leaving pt's room    Enid Luis    Clinician     Miriam Wyatt, [address]  Speech-Language Pathologist  Pager [phone]  Speech Therapist was present, directed the patient's care, made skilled judgement, and was responsible for assessment and treatment of the patient.

## 2023-02-07 NOTE — PROGRESS NOTES
Patient a/o x4. VSS. Denies pain. NIHSS 1 for numbness to LUE. Strong steady gait. Telemetry in place. Resting well overnight.

## 2023-02-07 NOTE — PLAN OF CARE
Problem: Discharge Planning  Goal: Discharge to home or other facility with appropriate resources  Outcome: Progressing     Problem: Pain  Goal: Verbalizes/displays adequate comfort level or baseline comfort level  2/7/2023 1123 by Guera Baptiste RN  Outcome: Progressing  2/7/2023 0403 by Tete Camacho RN  Outcome: Progressing     Problem: Safety - Adult  Goal: Free from fall injury  2/7/2023 1123 by Guera Baptiste RN  Outcome: Progressing  2/7/2023 0403 by Tete Camacho RN  Outcome: Progressing     Problem: ABCDS Injury Assessment  Goal: Absence of physical injury  Outcome: Progressing

## 2023-02-07 NOTE — PROGRESS NOTES
Pt alert & oriented x4; VSS On room air. Patient waiting for MRI and Xray. Neurosurgery team to follow up with patient. Ambulates independently. Denies pain.

## 2023-02-08 ENCOUNTER — APPOINTMENT (OUTPATIENT)
Dept: GENERAL RADIOLOGY | Age: 73
DRG: 552 | End: 2023-02-08
Attending: INTERNAL MEDICINE
Payer: MEDICARE

## 2023-02-08 ENCOUNTER — APPOINTMENT (OUTPATIENT)
Dept: MRI IMAGING | Age: 73
DRG: 552 | End: 2023-02-08
Attending: INTERNAL MEDICINE
Payer: MEDICARE

## 2023-02-08 VITALS
DIASTOLIC BLOOD PRESSURE: 76 MMHG | HEIGHT: 67 IN | SYSTOLIC BLOOD PRESSURE: 145 MMHG | BODY MASS INDEX: 21.19 KG/M2 | OXYGEN SATURATION: 95 % | RESPIRATION RATE: 16 BRPM | TEMPERATURE: 97.5 F | HEART RATE: 75 BPM | WEIGHT: 135 LBS

## 2023-02-08 LAB
ESTIMATED AVERAGE GLUCOSE: 108.3 MG/DL
HBA1C MFR BLD: 5.4 %

## 2023-02-08 PROCEDURE — 6370000000 HC RX 637 (ALT 250 FOR IP): Performed by: INTERNAL MEDICINE

## 2023-02-08 PROCEDURE — 72040 X-RAY EXAM NECK SPINE 2-3 VW: CPT

## 2023-02-08 PROCEDURE — 6360000002 HC RX W HCPCS: Performed by: INTERNAL MEDICINE

## 2023-02-08 PROCEDURE — 72141 MRI NECK SPINE W/O DYE: CPT

## 2023-02-08 PROCEDURE — 99231 SBSQ HOSP IP/OBS SF/LOW 25: CPT | Performed by: NURSE PRACTITIONER

## 2023-02-08 RX ORDER — ASPIRIN 81 MG/1
81 TABLET ORAL DAILY
Qty: 30 TABLET | Refills: 3 | Status: SHIPPED | OUTPATIENT
Start: 2023-02-09

## 2023-02-08 RX ORDER — NICOTINE 21 MG/24HR
1 PATCH, TRANSDERMAL 24 HOURS TRANSDERMAL DAILY
Qty: 30 PATCH | Refills: 3 | Status: SHIPPED | OUTPATIENT
Start: 2023-02-08

## 2023-02-08 RX ORDER — ATORVASTATIN CALCIUM 80 MG/1
80 TABLET, FILM COATED ORAL NIGHTLY
Qty: 30 TABLET | Refills: 3 | Status: SHIPPED | OUTPATIENT
Start: 2023-02-08

## 2023-02-08 RX ORDER — METHOCARBAMOL 500 MG/1
500 TABLET, FILM COATED ORAL EVERY 6 HOURS PRN
Qty: 40 TABLET | Refills: 0 | Status: SHIPPED | OUTPATIENT
Start: 2023-02-08 | End: 2023-02-18

## 2023-02-08 RX ADMIN — ENOXAPARIN SODIUM 40 MG: 100 INJECTION SUBCUTANEOUS at 08:20

## 2023-02-08 RX ADMIN — NIFEDIPINE 60 MG: 60 TABLET, FILM COATED, EXTENDED RELEASE ORAL at 08:19

## 2023-02-08 RX ADMIN — ASPIRIN 81 MG: 81 TABLET, COATED ORAL at 08:20

## 2023-02-08 NOTE — DISCHARGE SUMMARY
Hospital Discharge Summary    Patient's PCP: Tisha Delvalle MD  Admit Date: 2/6/2023   Discharge Date: 2/8/2023    Admitting Physician: Dr. Neville Padilla MD  Discharge Physician: Dr. Armando Samson MD     Consults:   IP CONSULT TO NEUROLOGY  IP CONSULT TO NEUROSURGERY    Brief HPI: 71 y.o. female who was transferred here from outside hospital for focal neurological deficit and neurology consult. Patient states that she started feeling left arm numbness around 9:30 AM today. These symptoms are intermittent and can vary from 30 seconds to a couple of hours in duration. Patient has been getting these episodes intermittently over the past year. Patient states it comes as tingling in the left arm and it goes numb and feels like her arm is not attached to the shoulder. Left arm gets limp and gets out of control. Denies any other focal neurological symptoms to include headache, changes in vision/speech, other focal motor or sensory deficits, unsteady gait, ataxia. When the symptoms last for couple of hours patient feels dizzy and lightheaded. 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 Has had neck surgery about 5 years ago and has not had the symptoms prior to the surgery or after the surgery until 1 year ago. Patient is an active smoker, started smoking as a teenager and smokes more than 1 pack/day for the past 2 years     Patient is adopted and unable to provide any family history. Her biological sister is diagnosed with type II DM     Currently patient is completely asymptomatic and states that it recur.     Brief hospital course:   # Numbness and tingling of left arm    #TIA versus CVA    #Essential hypertension    #Tobacco abuse      Invasive procedures:  ***    Discharge Diagnoses:   Principal Problem:    Numbness and tingling  Resolved Problems:    * No resolved hospital problems. *      Physical Exam: BP (!) 145/76   Pulse 75   Temp 97.5 °F (36.4 °C) (Oral)   Resp 16   Ht 5' 7\" (1.702 m)   Wt 135 lb (61.2 kg)   SpO2 95%   BMI 21.14 kg/m²   Gen/overall appearance: Not in acute distress. Alert. Head: Normocephalic, atraumatic  Eyes: EOMI, good acuity  ENT:- Oral mucosa moist  Neck: No JVD, thyromegaly  CVS: Nml S1S2, no MRG, RRR  Pulm: Clear bilaterally. No crackles/wheezes  Gastrointestinal: Soft, NT/ND, +BS  Musculoskeletal: No edema. Warm  Neuro: No focal deficit. Moves extremity spontaneously. Psychiatry: Appropriate affect. Not agitated. Skin: Warm, dry with normal turgor. No rash        Significant diagnostic studies that may require follow up:  CT HEAD WO CONTRAST    Result Date: 2/6/2023  EXAMINATION: CT OF THE HEAD WITHOUT CONTRAST  2/6/2023 2:43 pm TECHNIQUE: CT of the head was performed without the administration of intravenous contrast. Automated exposure control, iterative reconstruction, and/or weight based adjustment of the mA/kV was utilized to reduce the radiation dose to as low as reasonably achievable. COMPARISON: None. HISTORY: ORDERING SYSTEM PROVIDED HISTORY: Stroke TECHNOLOGIST PROVIDED HISTORY: Has a \"code stroke\" or \"stroke alert\" been called? ->Yes Reason for exam:->Stroke Decision Support Exception - unselect if not a suspected or confirmed emergency medical condition->Emergency Medical Condition (MA) Reason for Exam: Stroke FINDINGS: BRAIN/VENTRICLES: There is no acute intracranial hemorrhage, mass effect or midline shift. No abnormal extra-axial fluid collection. The gray-white differentiation is maintained without evidence of an acute infarct. There is no evidence of hydrocephalus. Cortical atrophy, subcortical white matter changes noted consistent with microvascular degenerative disease. Mild atherosclerotic calcification noted in distal internal carotid and vertebral arteries.  ORBITS: The visualized portion of the orbits demonstrate no acute abnormality. SINUSES: Mucosal thickening right side of septated sphenoid sinus, posterior right ethmoid air cells consistent with chronic inflammatory sinus disease. Otherwise, no acute abnormality. SOFT TISSUES/SKULL:  No acute abnormality of the visualized skull or soft tissues. No noncontrast CT evidence of acute intracranial pathology. Cortical atrophy, subcortical white matter changes consistent with microvascular degenerative disease incidentally noted. Findings consistent with focal mild chronic inflammatory sinus disease right side of septated sphenoid sinus and posterior right ethmoid air cells. MRI CERVICAL SPINE WO CONTRAST    Result Date: 2/8/2023  Exam:MRI CERVICAL SPINE WO CONTRAST Date:2/8/2023 12:01 AM Indication: Left upper extremity numbness Comparison: None Technique: Multiplanar multisequence MR images obtained of the cervical spine without intravenous contrast. Contrast:  None FINDINGS: Localizer images and extraspinal structures: No additional abnormality. Marrow signal/bony structures: Multifocal degenerative endplate signal change at C5-C6. There is also marrow edema centered about the left C3-C4 facet joint, likely reactive or stress related. No suspicious marrow signal. Alignment: Grade 1 anterolisthesis at C3-C4, C4-C5, and C7-T1. Grade 1 retrolisthesis at C5-C6. Cervical cord and spinal canal: Normal cervical cord signal and morphology. Paravertebral soft tissues: Mild paraspinal edema centered about the left C3-C4 facet joint. Postoperative findings: None identified. Degenerative findings: Moderate disc height loss at C5-C6 and C6-C7. C2-C3: Moderate noncompressive right facet hypertrophy. C3-C4: Mild disc bulge/pseudobulge. Mild bilateral uncovertebral hypertrophy. Moderate bilateral facet hypertrophy. Left facet effusion. Moderate bilateral C4 foraminal stenosis. C4-C5: Mild disc bulge/pseudobulge. Mild left uncovertebral and foraminal spurring.  Mild right, moderate left facet hypertrophy. Moderate to severe left C5 foraminal stenosis. C5-C6: Mild disc bulge with endplate spurring. Moderate bilateral uncovertebral hypertrophy. Moderate lateral facet hypertrophy. Moderate to severe right, severe left C6 foraminal stenosis. Mild central canal stenosis. C6-C7: Mild disc bulge. Mild bilateral uncovertebral hypertrophy. Moderate bilateral facet hypertrophy. Moderate right, severe left C7 foraminal stenosis. C7-T1: Mild disc bulge/pseudobulge. Moderate bilateral facet hypertrophy. Moderate bilateral C8 foraminal stenosis. Multilevel cervical spondylosis and facet arthropathy with multilevel degenerative listhesis. Marrow edema and soft tissue edema centered about the left C3-C4 is likely reactive or stress related secondary to facet arthropathy. Mild central canal stenosis at C5-C6. No areas cord compression. Severe left C6 and left C7 foraminal stenosis. Moderate to severe left C5 and right C6 foraminal stenosis. Other areas of lesser foraminal stenosis as detailed. XR CHEST PORTABLE    Result Date: 2/6/2023  EXAMINATION: ONE XRAY VIEW OF THE CHEST 2/6/2023 3:16 pm COMPARISON: Chest CT 01/27/2022 HISTORY: ORDERING SYSTEM PROVIDED HISTORY: numbness TECHNOLOGIST PROVIDED HISTORY: Reason for exam:->numbness Reason for Exam: numbness FINDINGS: No acute pulmonary consolidation, infiltrate, detectable pleural effusion, pneumothorax, pulmonary edema, cardiomegaly or mediastinal widening. Radiographically nonacute portable chest.     XR CERVICAL SPINE FLEXION AND EXTENSION    Result Date: 2/8/2023  EXAM: XR CERVICAL SPINE FLEXION AND EXTENSION. DATE: 2/8/2023 12:24 AM. INDICATION: r/o instability COMPARISON: None TECHNIQUE: Standard per department protocol. FINDINGS: There is minimal grade 1 anterolisthesis at C3-C4 and C4-C5 and minimal grade 1 retrolisthesis at C5-C6, with slight changes during flexion and extension, compatible with instability at these levels. Moderate to severe spondylosis at C5-C6 and mild spondylosis at other levels. Moderate facet arthropathy. Prevertebral soft tissues unremarkable. Minimal multilevel listhesis with evidence of instability at C3-C4, C4-C5, and C5-C6. CTA HEAD NECK W CONTRAST    Result Date: 2/6/2023  EXAMINATION: CTA OF THE HEAD AND NECK WITH CONTRAST 2/6/2023 2:48 pm: TECHNIQUE: CTA of the head and neck was performed with the administration of intravenous contrast. Multiplanar reformatted images are provided for review. MIP images are provided for review. Stenosis of the internal carotid arteries measured using NASCET criteria. Automated exposure control, iterative reconstruction, and/or weight based adjustment of the mA/kV was utilized to reduce the radiation dose to as low as reasonably achievable. 3D reconstructed and curved MIP images were performed on a separate workstation and provided for review. This scan was analyzed using [employer] contact LVO. Identification of suspected findings is not for diagnostic use beyond notification. [employer] LVO is limited to analysis of imaging data and should not be used in-lieu of full patient evaluation or relied upon to make or confirm diagnosis. COMPARISON: Concurrent head CT HISTORY: ORDERING SYSTEM PROVIDED HISTORY: Stroke FINDINGS: CTA NECK: AORTIC ARCH/ARCH VESSELS: No dissection or arterial injury. No significant stenosis of the brachiocephalic or subclavian arteries. CAROTID ARTERIES: No dissection, arterial injury, or hemodynamically significant stenosis by NASCET criteria. VERTEBRAL ARTERIES: No dissection, arterial injury, or significant stenosis. SOFT TISSUES: The lung apices are emphysematous. Similar appearing 3 mm nodule within the right upper lobe. No cervical or superior mediastinal lymphadenopathy. The larynx and pharynx are unremarkable. No acute abnormality of the salivary and thyroid glands. BONES: No acute osseous abnormality.  CTA HEAD: ANTERIOR CIRCULATION: No significant stenosis of the intracranial internal carotid, anterior cerebral, or middle cerebral arteries. There is a 2 mm dorsally projecting conical outpouching involving the left ICA terminus, either reflecting a small aneurysm or infundibulum associated with the anterior choroidal artery. POSTERIOR CIRCULATION: No significant stenosis of the vertebral, basilar, or posterior cerebral arteries. No aneurysm. OTHER: No dural venous sinus thrombosis on this non-dedicated study. BRAIN: No mass effect or midline shift. No extra-axial fluid collection. The gray-white differentiation is maintained. No flow limiting stenosis or occlusion within the head or neck. 2 mm dorsally projecting conical outpouching involving the left ICA terminus, either reflecting a small aneurysm or infundibulum associated with the anterior choroidal artery. MRI BRAIN W WO CONTRAST    Result Date: 2/7/2023  EXAM: MRI BRAIN W WO CONTRAST INDICATION: Focal neurologic deficit, rule out stroke COMPARISON: Head CT dated February 6, 2023 TECHNIQUE: Standard per department protocol prior to and following administration of 13 mL ProHance intravenous contrast. FINDINGS: No diffusion restriction. Small focus of susceptibility artifact in the right frontal lobe. No abnormal enhancement. Moderate scattered foci of hyperintense T2 signal within the periventricular, subcortical, and brainstem white matter. The ventricles and extra-axial spaces are normal in size and configuration. The midline structures including the pituitary gland, optic chiasm, corpus callosum, brainstem, pineal gland, and cerebellar tonsils are normal. Opacification of a hypoplastic right sphenoid sinus. The globes and orbits appear normal. The intracranial arterial and venous flow-voids are normal. No skull base or calvarial abnormality. 1. No acute stroke, mass, or hemorrhage. 2. Small remote parenchymal hemorrhage in the right frontal lobe.  3. Moderate chronic small vessel ischemic white matter disease. Treatments: As above. Discharge Medications:     Medication List        START taking these medications      aspirin 81 MG EC tablet  Take 1 tablet by mouth daily  Start taking on: February 9, 2023     methocarbamol 500 MG tablet  Commonly known as: ROBAXIN  Take 1 tablet by mouth every 6 hours as needed (muscle spasm)     nicotine 14 MG/24HR  Commonly known as: NICODERM CQ  Place 1 patch onto the skin daily            CHANGE how you take these medications      atorvastatin 80 MG tablet  Commonly known as: LIPITOR  Take 1 tablet by mouth nightly  What changed:   medication strength  how much to take  how to take this  when to take this            CONTINUE taking these medications      * albuterol sulfate  (90 Base) MCG/ACT inhaler  Commonly known as: Proventil HFA  INHALE 2 PUFFS BY MOUTH EVERY SIX HOURS AS NEEDED     * albuterol (2.5 MG/3ML) 0.083% nebulizer solution  Commonly known as: PROVENTIL  Take 3 mLs by nebulization every 6 hours as needed for Wheezing or Shortness of Breath     Breztri Aerosphere 160-9-4.8 MCG/ACT Aero  Generic drug: Budeson-Glycopyrrol-Formoterol  Inhale 2 puffs into the lungs 2 times daily     NIFEdipine 60 MG extended release tablet  Commonly known as: PROCARDIA XL     omeprazole 20 MG delayed release capsule  Commonly known as: PRILOSEC     VITAMIN D2 PO           * This list has 2 medication(s) that are the same as other medications prescribed for you. Read the directions carefully, and ask your doctor or other care provider to review them with you. ASK your doctor about these medications      spironolactone 25 MG tablet  Commonly known as: ALDACTONE               Where to Get Your Medications        These medications were sent to [place], [address] Reinier Markham [phone] - F [phone] [address] [facility] [address]      Phone: [phone]   aspirin 81 MG EC tablet  atorvastatin 80 MG tablet  methocarbamol 500 MG tablet  nicotine 14 MG/24HR         Activity: {discharge activity:26744}  Diet: ADULT DIET; Regular; 4 carb choices (60 gm/meal); Low Fat/Low Chol/High Fiber/2 gm Na      Disposition: {disposition:18248}  Discharged Condition: {STABLE/UNSTABLE:096651855}  Follow Up: Esme Butler MD  [address] [facility]    Schedule an appointment as soon as possible for a visit in 2 week(s)  Please call [phone] to schedule an appointment. Let them know you are also needing an appointment with Dr. Shailesh Dejesus in order to try and get both appointments on the same day around the same time. Maryellen Campos MD  [address]  [phone]    Schedule an appointment as soon as possible for a visit in 2 week(s)  Please call [phone] to schedule an appointment. Let them know you are also needing an appointment with Dr. Nayana Blanco in order to try and get both appointments on the same day around the same time. Code status:  Full Code         Total time spent on discharge, finalizing medications, referrals and arranging outpatient follow up was more than {Time; 15 min - 8 hours:78399}      Thank you Dr. Leonardo Fam MD for the opportunity to be involved in this patients care.

## 2023-02-08 NOTE — PROGRESS NOTES
Patient a/o x4. VSS. Denies pain. NIHSS 1 for numbness to LUE. Strong steady gait. Patient to MRI around midnight. Patient anxious and ready to go home. Resting well overnight. Electronically signed: Megan Carr.  [facility], 3693, 2/8/23

## 2023-02-08 NOTE — PLAN OF CARE
Problem: Discharge Planning  Goal: Discharge to home or other facility with appropriate resources  2/8/2023 1419 by Ruben Baptiste RN  Outcome: Adequate for Discharge  2/8/2023 1228 by Narayan Mckeon RN  Outcome: Progressing     Problem: Pain  Goal: Verbalizes/displays adequate comfort level or baseline comfort level  Outcome: Adequate for Discharge  Flowsheets  Taken 2/8/2023 3620 by Hesham Navarro RN  Verbalizes/displays adequate comfort level or baseline comfort level: Encourage patient to monitor pain and request assistance  Taken 2/8/2023 0239 by Hesham Navarro RN  Verbalizes/displays adequate comfort level or baseline comfort level: Encourage patient to monitor pain and request assistance     Problem: Safety - Adult  Goal: Free from fall injury  Outcome: Adequate for Discharge     Problem: ABCDS Injury Assessment  Goal: Absence of physical injury  Outcome: Adequate for Discharge

## 2023-02-08 NOTE — CARE COORDINATION
Case Management Assessment            Discharge Note                    Date / Time of Note: 2/8/2023 12:50 PM                  Discharge Note Completed by: PADDY Dowd    Patient Name: Carver Severe   YOB: 1950  Diagnosis: Numbness and tingling [R20.0, R20.2]   Date / Time: 2/6/2023  8:37 PM    Current PCP: Justina Allen MD  Clinic patient: No    Hospitalization in the last 30 days: No    Advance Directives:  Code Status: Full Code  [place] DNR form completed and on chart: Not Indicated    Financial:  Payor: MEDICARE / Plan: MEDICARE PART A AND B / Product Type: *No Product type* /      Pharmacy:    [address] Isabel Molina [phone] - F [phone]  [address] [place] [address]  Phone: [phone] Fax: [phone]      Assistance purchasing medications?:    Assistance provided by Case Management: None at this time    Does patient want to participate in local refill/ meds to beds program?: Yes    Meds To Beds General Rules:  1. Can ONLY be done Monday- Friday between 8:30am-5pm  2. Prescription(s) must be in pharmacy by 3pm to be filled same day  3. Copy of patient's insurance/ prescription drug card and patient face sheet must be sent along with the prescription(s)  4. Cost of Rx cannot be added to hospital bill. If financial assistance is needed, please contact unit  or ;  or  CANNOT provide pharmacy voucher for patients co-pays  5.  Patients can then  the prescription on their way out of the hospital at discharge, or pharmacy can deliver to the bedside if staff is available. (payment due at time of pick-up or delivery - cash, check, or card accepted)     Able to afford home medications/ co-pay costs: Yes    ADLS:  Current PT AM-PAC Score: 24 /24  Current OT AM-PAC Score: 24 /24      DISCHARGE Disposition: Home- No Services Needed    LOC at discharge: Not Applicable  POLLO Completed: Not Indicated    Notification completed in HENS/PAS?:  Not Applicable    IMM Completed:   Not Indicated    Transportation:  Transportation PLAN for discharge: family   Mode of Transport: Private Car  Reason for medical transport: Not Applicable  Name of Transport Company: Not Applicable  Time of Transport: n/a    Transport form completed: Not Indicated    Referrals made at [employer] [facility] for outpatient continued care:  Not Applicable    Additional CM Notes: Pt is from home with her son where is she is independent pta. Pt will return home at discharge and her son will provide transportation. Pt's son will be here around 1500 today. Pt has no HHC, DME, or CM needs at discharge. The Plan for Transition of Care is related to the following treatment goals of Numbness and tingling [R20.0, R20.2]    The Patient and/or patient representative Alize Caballero and her family were provided with a choice of provider and agrees with the discharge plan Yes    Freedom of choice list was provided with basic dialogue that supports the patient's individualized plan of care/goals and shares the quality data associated with the providers.  Yes    Care Transitions patient: No    PADDY Henderson  [facility] [email or web address]  Case Management Department  Ph: [phone]  Fax: [phone]

## 2023-02-08 NOTE — PLAN OF CARE
Problem: Discharge Planning  Goal: Discharge to home or other facility with appropriate resources  2/7/2023 2303 by Lucrezia Bernheim, RN  Outcome: Progressing  Flowsheets  Taken 2/7/2023 2300  Discharge to home or other facility with appropriate resources: Identify barriers to discharge with patient and caregiver  Taken 2/7/2023 1930  Discharge to home or other facility with appropriate resources: Identify barriers to discharge with patient and caregiver  2/7/2023 1123 by Peace Baptiste RN  Outcome: Progressing     Problem: Pain  Goal: Verbalizes/displays adequate comfort level or baseline comfort level  2/7/2023 2303 by Lucrezia Bernheim, RN  Outcome: Progressing  2/7/2023 1123 by Kenzie Baptiste RN  Outcome: Progressing     Problem: Safety - Adult  Goal: Free from fall injury  2/7/2023 2303 by Lucrezia Bernheim, RN  Outcome: Progressing  Flowsheets (Taken 2/7/2023 1947)  Free From Fall Injury: Instruct family/caregiver on patient safety  2/7/2023 1123 by Peace Baptiste RN  Outcome: Progressing     Problem: ABCDS Injury Assessment  Goal: Absence of physical injury  2/7/2023 2303 by Lucrezia Bernheim, RN  Outcome: Progressing  Flowsheets (Taken 2/7/2023 1947)  Absence of Physical Injury: Implement safety measures based on patient assessment  2/7/2023 1123 by Kenzie Baptiste RN  Outcome: Progressing

## 2023-02-08 NOTE — PROGRESS NOTES
NEUROSURGERY PROGRESS NOTE    2/8/2023 9:18 AM                               Heike Anne                      LOS: 2 days     Subjective: Patient sitting up in bed upon entering the room. No acute events overnight. Patient has no specific complaints this morning. Physical Exam:  Patient seen and examined    Vitals:    02/08/23 0633   BP: 126/76   Pulse: 71   Resp: 18   Temp: 98 °F (36.7 °C)   SpO2: 99%     GCS:  4 - Opens eyes on own  5 - Alert and oriented  6 - Follows simple motor commands  General: Well developed. Alert and cooperative in no acute distress. HENT: atraumatic, neck supple  Eyes: Optic discs: Not tested  Pulmonary: unlabored respiratory effort  Cardiovascular:  Warm well perfused. No peripheral edema  Gastrointestinal: abdomen soft, NT, ND     Neurological:  Mental Status: Awake, alert, oriented x 4, speech clear and appropriate  Attention: Intact  Language: No aphasia or dysarthria noted  Sensation: Intact to all extremities to light touch, except left arm numbness  Coordination: Intact     Cranial Nerves:  II: Visual acuity not tested, denies new visual changes / diplopia  III, IV, VI: PERRL, 3 mm bilaterally, EOMI, no nystagmus noted  V: Facial sensation intact bilaterally to touch  VII: Face symmetric  VIII: Hearing intact bilaterally to spoken voice  IX: Palate movement equal bilaterally  XI: Shoulder shrug equal bilaterally  XII: Tongue midline     Musculoskeletal:   Gait: Not tested   Assist devices: None   Tone: Normal  Motor strength:     Right  Left      Right  Left    Deltoid  5 5   Hip Flex  5 5   Biceps  5 5   Knee Extensors  5 5   Triceps  5 5   Knee Flexors  5 5   Wrist Ext  5 5   Ankle Dorsiflex. 5 5   Wrist Flex  5 5   Ankle Plantarflex. 5 5   Handgrip  5 5   Ext Chema Longus  5 5   Thumb Ext  5 5              Radiological Findings:  CT HEAD WO CONTRAST  Result Date: 2/6/2023  No noncontrast CT evidence of acute intracranial pathology.   Cortical atrophy, subcortical white matter changes consistent with microvascular degenerative disease incidentally noted. Findings consistent with focal mild chronic inflammatory sinus disease right side of septated sphenoid sinus and posterior right ethmoid air cells. CTA HEAD NECK W CONTRAST  Result Date: 2/6/2023  No flow limiting stenosis or occlusion within the head or neck. 2 mm dorsally projecting conical outpouching involving the left ICA terminus, either reflecting a small aneurysm or infundibulum associated with the anterior choroidal artery. MRI BRAIN W WO CONTRAST  Result Date: 2/7/2023  1. No acute stroke, mass, or hemorrhage. 2. Small remote parenchymal hemorrhage in the right frontal lobe. 3. Moderate chronic small vessel ischemic white matter disease. MRI CERVICAL SPINE WO CONTRAST  Result Date: 2/8/2023  Multilevel cervical spondylosis and facet arthropathy with multilevel degenerative listhesis. Marrow edema and soft tissue edema centered about the left C3-C4 is likely reactive or stress related secondary to facet arthropathy. Mild central canal stenosis at C5-C6. No areas cord compression. Severe left C6 and left C7 foraminal stenosis. Moderate to severe left C5 and right C6 foraminal stenosis. Other areas of lesser foraminal stenosis as detailed. XR CERVICAL SPINE FLEXION AND EXTENSION  Result Date: 2/8/2023  Minimal multilevel listhesis with evidence of instability at C3-C4, C4-C5, and C5-C6.         Labs:  Recent Labs     02/07/23  0728   WBC 6.5   HGB 14.2   HCT 41.8          Recent Labs     02/06/23  1422 02/06/23  1457   *  --    K 3.6  --    CL 98*  --    CO2 24  --    BUN 12  --    CREATININE 0.7  --    GLUCOSE 131* 132   CALCIUM 8.7  --        Recent Labs     02/06/23  1422   PROTIME 12.2   INR 0.92       Patient Active Problem List    Diagnosis Date Noted    Numbness and tingling 02/06/2023    Degeneration of cervical intervertebral disc 09/13/2016    Cervical radiculopathy, chronic 09/13/2016    Displacement of cervical intervertebral disc without myelopathy 09/13/2016    Cervical spondylosis without myelopathy 09/13/2016    Asthma with COPD ([facility] 11/23/2015    Shortness of breath 11/23/2015    Tobacco abuse 11/23/2015    GERD (gastroesophageal reflux disease) 2015    Coronary artery calcification seen on CT scan 2015    Centrilobular emphysema ([facility] [address] 2015       Assessment:  Patient is a 71 y.o. female w/left arm numbness that has been intermittently occurring over the past year. Plan:  No emergent neurosurgical intervention indicated  Neurologic exams frequency: Q4H  For change in exam MUST contact neurosurgery team along with critical care or primary team  Left Arm Numbness:  - CTA Head/Neck shows 2 mm dorsally projecting conical outpouching involving the left ICA terminus, either reflecting a small aneurysm or infundibulum. Incidental finding and not the source of LUE numbness, but should follow up with Dr Mercado in clinic  - MRI Cervical shows severe left foraminal stenosis at C4-C5, C5-C6, and C6-C7  - XR Cervical Flex/ex shows instability at C3-C4, C4-C5, and C5-C6  - Robaxin for muscle spasms  - Follow up with Dr. Erlinda Sever in clinic  Pain control: Managed by medical team  Advance diet / activity per primary team  Will sign off. Please call with any questions or decline in neurological status     DISPO: Dispo timing to be determined by primary team once patient is medically stable for discharge. Patient was discussed with Dr. Erlinda Sever who agrees with above assessment and plan. Electronically signed by: TERESITA Joyce CNP, APRN-CNP, 2/8/2023 9:18 AM  [phone]    I spent 20 minutes in the care of this patient.   Over 50% of that time was in face-to-face counseling regarding disease process, diagnostic testing, preventative measures, and answering patient and family questions

## 2023-02-09 NOTE — PROGRESS NOTES
Physician Progress Note      Dora Venegas  CSN #:                  [id number]  :                       1950  ADMIT DATE:       2023 8:37 PM  [address] [place] DATE:        2023 3:38 PM  RESPONDING  PROVIDER #:        Faye Doss MD          QUERY TEXT:    Pt admitted with of Left arm numbness. Pt noted to have \"severe left foraminal   stenosis at C4-C5, C5-C6, and C6-C7\". If possible, please document in   progress notes and discharge summary the suspected cause of left arm numbness. The medical record reflects the following:  Risk Factors: severe left foraminal stenosis at C4-C5, C5-C6, and C6-C7  Clinical Indicators: per Neurology on  \"This is unlikely TIA and, based on   MRI, is clearly not stroke. Given  her description, more concerned about possible cervical spine issue\"; MRI   c-spine: Multilevel cervical  spondylosis and facet arthropathy with multilevel degenerative listhesis. Mild central canal stenosis at C5-C6. Severe left C6 and  left C7 foraminal stenosis. Moderate to severe left C5 and right C6 foraminal   stenosis. Treatment: CBC, CMP, CT of head and neck, MRI of brain and cervical spine,   Neurology and Neurosurgery  consult, per Neurosurgery \"Neurologic exams frequency: Q4H  Options provided:  -- Left arm numbness due to severe left foraminal stenosis at C4-C5, C5-C6,   and C6-C7  -- Left arm numbness unrelated to severe left foraminal stenosis at C4-C5,   C5-C6, and C6-C7  -- Other - I will add my own diagnosis  -- Disagree - Not applicable / Not valid  -- Disagree - Clinically unable to determine / Unknown  -- Refer to Clinical Documentation Reviewer    PROVIDER RESPONSE TEXT:    This patient has Left arm numbness due to severe left foraminal stenosis at   C4-C5, C5-C6, and C6-C7.     Query created by: Tavon Zapata on 2023 6:27 PM      Electronically signed by:  Faye Doss MD 2023 12:01 PM

## 2023-03-03 ENCOUNTER — HOSPITAL ENCOUNTER (OUTPATIENT)
Dept: CT IMAGING | Age: 73
Discharge: HOME | End: 2023-03-03
Payer: MEDICARE

## 2023-03-03 DIAGNOSIS — Z72.0 TOBACCO ABUSE: ICD-10-CM

## 2023-03-03 DIAGNOSIS — J43.2 CENTRILOBULAR EMPHYSEMA (HCC): ICD-10-CM

## 2023-03-03 PROCEDURE — 71250 CT THORAX DX C-: CPT

## 2023-04-17 ENCOUNTER — HOSPITAL ENCOUNTER (EMERGENCY)
Age: 73
Discharge: HOME | End: 2023-04-17
Payer: MEDICARE

## 2023-04-17 ENCOUNTER — OFFICE VISIT (OUTPATIENT)
Dept: PULMONOLOGY | Age: 73
End: 2023-04-17
Payer: MEDICARE

## 2023-04-17 VITALS
SYSTOLIC BLOOD PRESSURE: 158 MMHG | BODY MASS INDEX: 20.4 KG/M2 | HEIGHT: 67 IN | DIASTOLIC BLOOD PRESSURE: 84 MMHG | HEART RATE: 84 BPM | WEIGHT: 130 LBS | OXYGEN SATURATION: 96 % | TEMPERATURE: 97 F | RESPIRATION RATE: 16 BRPM

## 2023-04-17 VITALS
HEART RATE: 86 BPM | HEIGHT: 67 IN | BODY MASS INDEX: 20.4 KG/M2 | TEMPERATURE: 97.6 F | DIASTOLIC BLOOD PRESSURE: 83 MMHG | RESPIRATION RATE: 16 BRPM | WEIGHT: 130 LBS | SYSTOLIC BLOOD PRESSURE: 143 MMHG | OXYGEN SATURATION: 96 %

## 2023-04-17 DIAGNOSIS — R06.02 SHORTNESS OF BREATH: ICD-10-CM

## 2023-04-17 DIAGNOSIS — J43.2 CENTRILOBULAR EMPHYSEMA (HCC): Primary | ICD-10-CM

## 2023-04-17 DIAGNOSIS — I25.10 CORONARY ARTERY CALCIFICATION SEEN ON CT SCAN: ICD-10-CM

## 2023-04-17 DIAGNOSIS — Z87.891 PERSONAL HISTORY OF TOBACCO USE: ICD-10-CM

## 2023-04-17 DIAGNOSIS — Z72.0 TOBACCO ABUSE: ICD-10-CM

## 2023-04-17 DIAGNOSIS — S81.819A: Primary | ICD-10-CM

## 2023-04-17 PROCEDURE — 1090F PRES/ABSN URINE INCON ASSESS: CPT | Performed by: INTERNAL MEDICINE

## 2023-04-17 PROCEDURE — 99213 OFFICE O/P EST LOW 20 MIN: CPT | Performed by: INTERNAL MEDICINE

## 2023-04-17 PROCEDURE — 1123F ACP DISCUSS/DSCN MKR DOCD: CPT | Performed by: INTERNAL MEDICINE

## 2023-04-17 PROCEDURE — 3023F SPIROM DOC REV: CPT | Performed by: INTERNAL MEDICINE

## 2023-04-17 PROCEDURE — 3017F COLORECTAL CA SCREEN DOC REV: CPT | Performed by: INTERNAL MEDICINE

## 2023-04-17 PROCEDURE — G8400 PT W/DXA NO RESULTS DOC: HCPCS | Performed by: INTERNAL MEDICINE

## 2023-04-17 PROCEDURE — 12002 RPR S/N/AX/GEN/TRNK2.6-7.5CM: CPT

## 2023-04-17 PROCEDURE — G0296 VISIT TO DETERM LDCT ELIG: HCPCS | Performed by: INTERNAL MEDICINE

## 2023-04-17 PROCEDURE — G8420 CALC BMI NORM PARAMETERS: HCPCS | Performed by: INTERNAL MEDICINE

## 2023-04-17 PROCEDURE — 99283 EMERGENCY DEPT VISIT LOW MDM: CPT

## 2023-04-17 PROCEDURE — 6370000000 HC RX 637 (ALT 250 FOR IP): Performed by: PHYSICIAN ASSISTANT

## 2023-04-17 PROCEDURE — 4004F PT TOBACCO SCREEN RCVD TLK: CPT | Performed by: INTERNAL MEDICINE

## 2023-04-17 PROCEDURE — G8427 DOCREV CUR MEDS BY ELIG CLIN: HCPCS | Performed by: INTERNAL MEDICINE

## 2023-04-17 RX ORDER — CEPHALEXIN 250 MG/1
500 CAPSULE ORAL ONCE
Status: COMPLETED | OUTPATIENT
Start: 2023-04-17 | End: 2023-04-17

## 2023-04-17 RX ORDER — OMEGA-3S/DHA/EPA/FISH OIL/D3 300MG-1000
400 CAPSULE ORAL DAILY
COMMUNITY

## 2023-04-17 RX ORDER — CEPHALEXIN 500 MG/1
500 CAPSULE ORAL 3 TIMES DAILY
Qty: 21 CAPSULE | Refills: 0 | Status: SHIPPED | OUTPATIENT
Start: 2023-04-17 | End: 2023-04-24

## 2023-04-17 RX ORDER — BUDESONIDE, GLYCOPYRROLATE, AND FORMOTEROL FUMARATE 160; 9; 4.8 UG/1; UG/1; UG/1
2 AEROSOL, METERED RESPIRATORY (INHALATION) 2 TIMES DAILY
Qty: 1 EACH | Refills: 3 | Status: SHIPPED | OUTPATIENT
Start: 2023-04-17

## 2023-04-17 RX ORDER — ESTRADIOL 0.1 MG/G
CREAM VAGINAL
COMMUNITY
Start: 2023-02-13

## 2023-04-17 RX ORDER — EZETIMIBE 10 MG/1
TABLET ORAL
COMMUNITY

## 2023-04-17 RX ADMIN — CEPHALEXIN 500 MG: 250 CAPSULE ORAL at 14:16

## 2023-04-17 ASSESSMENT — PAIN SCALES - GENERAL: PAINLEVEL_OUTOF10: 6

## 2023-04-17 ASSESSMENT — PAIN - FUNCTIONAL ASSESSMENT: PAIN_FUNCTIONAL_ASSESSMENT: 0-10

## 2023-04-17 NOTE — ED NOTES
Pt right lower leg laceration cleansed/irrigated with Hibiclens/80ml Normal Saline. Pt tolerated well.      Ester Bunch LPN  23/69/66 [id number]

## 2023-04-17 NOTE — PATIENT INSTRUCTIONS
years can be dismissed from the practice. Please be aware that our physicians are required to work in the Intensive Care Unit at [facility].  Your appointment may need to be rescheduled if they are designated to work during your appointment time. You may receive a survey regarding the care you received during your visit. Your input is valuable to us. We encourage you to complete and return your survey. We hope you will choose us in the future for your healthcare needs. Pt instructed of all future appointment dates & times, including radiology, labs, procedures & referrals. If procedures were scheduled preparation instructions provided. Instructions on future appointments with [facility] were given. MA Communication:   The following orders are received by verbal communication from Edwardo Arnett MD    Orders include:  CT in 1 yr  Follow up after CT

## 2023-04-17 NOTE — PROGRESS NOTES
C/O Pulmonary evaluation and to discuss the clinical status       Patient has come back to the office for a pulmonary follow-up, patient states that multiple things have been in the last few days time, patient pulled her back along with that patient states that she moved cushion patient states today morning the wind caused her car door to open and had an leg got a gash and patient states that she had to clean it with peroxide and put dressings is still hurting, patient states that only if she does a lot of activity then she has some shortness of breath otherwise is reasonable, patient states that she was having coughing this weekend because she did not have any refill on her Breztri inhaler, patient does have some cough with expectoration which is mucoid in color, patient does not have any wheezing, patient does not have any significant pleuritic chest pain, no fever no chills, patient continues to be a smoker, patient has had some swelling of the legs because of injury in the past, patient does not have any confusion lethargy, patient does not have any other pertinent review of system of concern    Previous HPIPatient is a 81-year-old female who has come to the office for a pulmonary follow-up and to discuss the clinical status and options, patient states that her shortness of breath comes and goes, patient states that the [facility] inhaler was helping her but is not covered by insurance and patient states that she cannot afford the copayment, patient states that she also was having some nasal congestion sore throat and patient states that she had swollen gland glands and patient had 4 rounds of antibiotics in the recent past without any significant improvement, patient was recently seen by ENT and patient's Prilosec has been doubled, patient states that she was also told to follow-up with GI in the near future, patient states that she does have some phlegm which is thick and white, patient states that she

## 2023-04-17 NOTE — ED NOTES
Pt right leg suture repair applied with PSO/Adaptic/4x4 gauze/kerlex dressing.      Rosa Kennedy LPN  32/19/97 [id number]

## 2023-04-17 NOTE — ED NOTES
Pt script x1 instructed to follow up with PCP for suture removal. Assessed per June SMITH.      Carmen Babb, ARASH  86/35/04 [id number]

## 2023-04-17 NOTE — DISCHARGE INSTRUCTIONS
The flap laceration of your right lower leg was closed. Subcutaneous exposure was noted. I do believe this is the right direction in order to obtain proper coverage at this time. Keflex 500 mg p.o. given in ED. I did send prescription for continuation of Keflex 5 mg 3 times daily x7 days to your local outpatient pharmacy at this facility. Nursing staff will direct you to that location to  your prescription. I recommend Tylenol 1000 mg every 6 or 8 hours for pain control. Apply heat 3 times a day to help with circulation and healing. You may shower. After shower change dressing dry the area and placed in amount of antibiotic ointment such as bacitracin and then light gauze coverage.

## 2023-04-17 NOTE — PROGRESS NOTES
MA Communication:   The following orders are received by verbal communication from Paolo Hayes MD    Orders include:  CT in 1 yr  Follow up after CT

## 2023-04-17 NOTE — ED PROVIDER NOTES
as soon as possible for a visit in 10 days  For suture removal    [facility]  ED  [address] [facility] [address]  Go to   If symptoms worsen      DISCHARGE MEDICATIONS:  New Prescriptions    CEPHALEXIN (KEFLEX) 500 MG CAPSULE    Take 1 capsule by mouth 3 times daily for 7 days       DISCONTINUED MEDICATIONS:  Discontinued Medications    No medications on file              (Please note that portions of this note were completed with a voice recognition program.  Efforts were made to edit the dictations but occasionally words are mis-transcribed. )    Kylee Aleman PA-C (electronically signed)        Kylee Aleman PA-C  04/17/23 [id number]

## 2023-04-18 RX ORDER — BUDESONIDE, GLYCOPYRROLATE, AND FORMOTEROL FUMARATE 160; 9; 4.8 UG/1; UG/1; UG/1
2 AEROSOL, METERED RESPIRATORY (INHALATION) 2 TIMES DAILY
Qty: 2 EACH | Refills: 0 | COMMUNITY
Start: 2023-04-18

## 2023-04-21 ENCOUNTER — HOSPITAL ENCOUNTER (OUTPATIENT)
Dept: INTERVENTIONAL RADIOLOGY/VASCULAR | Age: 73
Discharge: HOME | End: 2023-04-21
Payer: MEDICARE

## 2023-04-21 VITALS — WEIGHT: 130 LBS | BODY MASS INDEX: 20.4 KG/M2 | TEMPERATURE: 98.2 F | HEIGHT: 67 IN

## 2023-04-21 DIAGNOSIS — I67.1 INTRACRANIAL ANEURYSM: ICD-10-CM

## 2023-04-21 LAB
ALBUMIN SERPL-MCNC: 4.4 G/DL (ref 3.4–5)
ALBUMIN/GLOB SERPL: 1.7 {RATIO} (ref 1.1–2.2)
ALP SERPL-CCNC: 75 U/L (ref 40–129)
ALT SERPL-CCNC: 11 U/L (ref 10–40)
ANION GAP SERPL CALCULATED.3IONS-SCNC: 10 MMOL/L (ref 3–16)
AST SERPL-CCNC: 13 U/L (ref 15–37)
BILIRUB SERPL-MCNC: 0.4 MG/DL (ref 0–1)
BUN SERPL-MCNC: 10 MG/DL (ref 7–20)
CALCIUM SERPL-MCNC: 9.2 MG/DL (ref 8.3–10.6)
CHLORIDE SERPL-SCNC: 101 MMOL/L (ref 99–110)
CO2 SERPL-SCNC: 28 MMOL/L (ref 21–32)
CREAT SERPL-MCNC: 0.6 MG/DL (ref 0.6–1.2)
DEPRECATED RDW RBC AUTO: 13.2 % (ref 12.4–15.4)
GFR SERPLBLD CREATININE-BSD FMLA CKD-EPI: >60 ML/MIN/{1.73_M2}
GLUCOSE SERPL-MCNC: 104 MG/DL (ref 70–99)
HCT VFR BLD AUTO: 44.9 % (ref 36–48)
HGB BLD-MCNC: 15.2 G/DL (ref 12–16)
INR PPP: 0.91 (ref 0.84–1.16)
MCH RBC QN AUTO: 32.3 PG (ref 26–34)
MCHC RBC AUTO-ENTMCNC: 33.8 G/DL (ref 31–36)
MCV RBC AUTO: 95.6 FL (ref 80–100)
PLATELET # BLD AUTO: 319 K/UL (ref 135–450)
PMV BLD AUTO: 7.7 FL (ref 5–10.5)
POTASSIUM SERPL-SCNC: 3.5 MMOL/L (ref 3.5–5.1)
PROT SERPL-MCNC: 7 G/DL (ref 6.4–8.2)
PROTHROMBIN TIME: 12.3 SEC (ref 11.5–14.8)
RBC # BLD AUTO: 4.7 M/UL (ref 4–5.2)
SODIUM SERPL-SCNC: 139 MMOL/L (ref 136–145)
WBC # BLD AUTO: 9.9 K/UL (ref 4–11)

## 2023-04-21 PROCEDURE — 99153 MOD SED SAME PHYS/QHP EA: CPT

## 2023-04-21 PROCEDURE — 76377 3D RENDER W/INTRP POSTPROCES: CPT

## 2023-04-21 PROCEDURE — 6360000004 HC RX CONTRAST MEDICATION: Performed by: NEUROLOGICAL SURGERY

## 2023-04-21 PROCEDURE — 80053 COMPREHEN METABOLIC PANEL: CPT

## 2023-04-21 PROCEDURE — 99152 MOD SED SAME PHYS/QHP 5/>YRS: CPT

## 2023-04-21 PROCEDURE — 36226 PLACE CATH VERTEBRAL ART: CPT

## 2023-04-21 PROCEDURE — 85027 COMPLETE CBC AUTOMATED: CPT

## 2023-04-21 PROCEDURE — 36224 PLACE CATH CAROTD ART: CPT

## 2023-04-21 PROCEDURE — 85610 PROTHROMBIN TIME: CPT

## 2023-04-21 RX ORDER — ACETAMINOPHEN 325 MG/1
650 TABLET ORAL EVERY 4 HOURS PRN
Status: DISCONTINUED | OUTPATIENT
Start: 2023-04-21 | End: 2023-04-22 | Stop reason: HOSPADM

## 2023-04-21 RX ORDER — IODIXANOL 270 MG/ML
200 INJECTION, SOLUTION INTRAVASCULAR ONCE
Status: COMPLETED | OUTPATIENT
Start: 2023-04-21 | End: 2023-04-21

## 2023-04-21 RX ORDER — ONDANSETRON 2 MG/ML
4 INJECTION INTRAMUSCULAR; INTRAVENOUS EVERY 8 HOURS PRN
Status: DISCONTINUED | OUTPATIENT
Start: 2023-04-21 | End: 2023-04-22 | Stop reason: HOSPADM

## 2023-04-21 RX ORDER — SODIUM CHLORIDE 9 MG/ML
INJECTION, SOLUTION INTRAVENOUS CONTINUOUS
Status: DISCONTINUED | OUTPATIENT
Start: 2023-04-21 | End: 2023-04-22 | Stop reason: HOSPADM

## 2023-04-21 RX ORDER — HYDROCODONE BITARTRATE AND ACETAMINOPHEN 5; 325 MG/1; MG/1
1 TABLET ORAL EVERY 4 HOURS PRN
Status: DISCONTINUED | OUTPATIENT
Start: 2023-04-21 | End: 2023-04-22 | Stop reason: HOSPADM

## 2023-04-21 RX ORDER — HYDROCODONE BITARTRATE AND ACETAMINOPHEN 5; 325 MG/1; MG/1
2 TABLET ORAL EVERY 4 HOURS PRN
Status: DISCONTINUED | OUTPATIENT
Start: 2023-04-21 | End: 2023-04-22 | Stop reason: HOSPADM

## 2023-04-21 RX ADMIN — IODIXANOL 115 ML: 270 INJECTION, SOLUTION INTRAVASCULAR at 13:39

## 2023-04-21 NOTE — H&P
History of Present Illness   HPI: Ms. Cele Dong comes in today, after having to reschedule from 2 weeks ago due to delays in my office, to discuss an incidental finding of a left carotid aneurysm. This was found during a hospital stay for left arm numbness and clumsiness. She was driving near her home in [place] when she noticed numbness in her left arm and was unable to smootly scratch an itch on her face, feeling that her arm was out of control. She then had a momentary lapse of orientation in a neighborhood that she's know very well for many years. She chose to drive to the [facility] ER where she had a stroke workup and was ultimately sent to [facility] for Neurosurgery workup. No stroke was identified and ultimately it was felt to be due to cervical arthropathy and stenosis. However, a small outpouching was noted suspicious for an aneurysm and a very small, remote right frontal cortical hemorrhage was noted, although clearly chronic by MRI. Of note, she has had numbness and paresthesias in the right arm for years after a fall at home with a significant shoulder and biceps injury. Vital Signs for Today's Encounter     Height: 5' 7'' Weight: 132 pounds    BMI: 20.67    Medical History       Prior neck/back surgery? yes    Area(s): [id number]  Has pt. had any other surgery? yes    Details: Other Major Non-Spine Surgery,Significant Orthopedic (knee, hip, shoulder)  Comorbidities:hypertension,none  Supplemental oxygen? no  Bleeding/clotting disorder? yes  Blood thinner?none  Has the patient had angioplasty? no  Does patient have stents? no  Other implant: none  Has patient had a flu shot this flu season (8/1-3/31)? Yes    Allergies   Allergic to shellfish, contrast dye, or iodine? NKA  Allergic to latex? NKA  Allergic to metals/jewelry? NKA  Allergic to steroids? NKA  Allergic to antibiotics? NKA  Allergic to tape?  NKA  Other allergies? no    Social History   Does the patient live with someone else? yes  Is

## 2023-05-30 ENCOUNTER — TELEPHONE (OUTPATIENT)
Dept: PULMONOLOGY | Age: 73
End: 2023-05-30

## 2023-05-30 RX ORDER — BUDESONIDE, GLYCOPYRROLATE, AND FORMOTEROL FUMARATE 160; 9; 4.8 UG/1; UG/1; UG/1
2 AEROSOL, METERED RESPIRATORY (INHALATION) 2 TIMES DAILY
Qty: 2 EACH | Refills: 0 | COMMUNITY
Start: 2023-05-30

## 2023-05-30 NOTE — TELEPHONE ENCOUNTER
Patient is calling stating Dr. Naheed Fiore told her to call when she needed more Breeztri samples. She is completely out and needs more samples. Please call pt and advise.

## 2023-06-30 RX ORDER — BUDESONIDE, GLYCOPYRROLATE, AND FORMOTEROL FUMARATE 160; 9; 4.8 UG/1; UG/1; UG/1
2 AEROSOL, METERED RESPIRATORY (INHALATION)
Qty: 3 EACH | Refills: 0 | Status: SHIPPED | OUTPATIENT
Start: 2023-06-30

## 2023-07-31 RX ORDER — BUDESONIDE, GLYCOPYRROLATE, AND FORMOTEROL FUMARATE 160; 9; 4.8 UG/1; UG/1; UG/1
2 AEROSOL, METERED RESPIRATORY (INHALATION) 2 TIMES DAILY
Qty: 3 EACH | Refills: 0 | COMMUNITY
Start: 2023-07-31

## 2023-08-03 ENCOUNTER — OFFICE VISIT (OUTPATIENT)
Dept: URGENT CARE | Age: 73
End: 2023-08-03

## 2023-08-03 VITALS
RESPIRATION RATE: 12 BRPM | BODY MASS INDEX: 19.62 KG/M2 | HEART RATE: 63 BPM | OXYGEN SATURATION: 94 % | WEIGHT: 125 LBS | DIASTOLIC BLOOD PRESSURE: 78 MMHG | SYSTOLIC BLOOD PRESSURE: 136 MMHG | TEMPERATURE: 97.9 F | HEIGHT: 67 IN

## 2023-08-03 DIAGNOSIS — S61.202A OPEN WOUND OF RIGHT MIDDLE FINGER WITHOUT DAMAGE TO NAIL, INITIAL ENCOUNTER: Primary | ICD-10-CM
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
## 2023-08-03 RX ORDER — BUSPIRONE HYDROCHLORIDE 5 MG/1
5 TABLET ORAL 2 TIMES DAILY PRN
COMMUNITY
Start: 2023-07-27

## 2023-08-03 ASSESSMENT — ENCOUNTER SYMPTOMS
SHORTNESS OF BREATH: 0
RHINORRHEA: 0
COUGH: 0
SORE THROAT: 0
ABDOMINAL PAIN: 0
NAUSEA: 0
VOMITING: 0
DIARRHEA: 0

## 2023-08-03 NOTE — PATIENT INSTRUCTIONS
Skin flap was repaired with 3 sutures. Watch for signs of infection (such as swelling, increased tenderness, discharge, purulence, spreading red rash, heat), and return to the clinic should any develop. Keep wound dressed for 24 hours before changing. Then change every 12-24 hours, or if dressings become dirty. Can start applying thin covering of a topical antibiotic ointment after 3 days.   Return in 10-14 days for suture removal.

## 2023-08-03 NOTE — PROGRESS NOTES
5' 7\" (1.702 m)       Review of Systems   Constitutional:  Negative for chills, fatigue and fever. HENT:  Negative for congestion, rhinorrhea and sore throat. Respiratory:  Negative for cough and shortness of breath. Cardiovascular:  Negative for chest pain. Gastrointestinal:  Negative for abdominal pain, diarrhea, nausea and vomiting. Musculoskeletal:  Negative for myalgias. Skin:  Positive for wound (laceration to the right middle finger). Neurological:  Negative for headaches. All other systems reviewed and are negative. Physical Exam  Constitutional:       Appearance: Normal appearance. HENT:      Head: Normocephalic and atraumatic. Eyes:      Pupils: Pupils are equal, round, and reactive to light. Cardiovascular:      Rate and Rhythm: Normal rate and regular rhythm. Heart sounds: Normal heart sounds. Pulmonary:      Effort: Pulmonary effort is normal.      Breath sounds: Normal breath sounds. Skin:     Findings: Wound (1.5cm skin tear located to the dorsum of the proximal phalanx of the right middle finger) present. Neurological:      Mental Status: She is alert and oriented to person, place, and time. Psychiatric:         Mood and Affect: Mood normal.         Behavior: Behavior normal.         NOTE: chart addended due to having signed chart initially on error before completed. An electronic signature was used to authenticate this note.     --Leigh Kumar PA-C

## 2023-08-22 ENCOUNTER — OFFICE VISIT (OUTPATIENT)
Dept: URGENT CARE | Age: 73
End: 2023-08-22

## 2023-08-22 VITALS
DIASTOLIC BLOOD PRESSURE: 80 MMHG | HEIGHT: 67 IN | BODY MASS INDEX: 19.62 KG/M2 | SYSTOLIC BLOOD PRESSURE: 145 MMHG | WEIGHT: 125 LBS | OXYGEN SATURATION: 94 % | HEART RATE: 90 BPM | TEMPERATURE: 98.3 F | RESPIRATION RATE: 18 BRPM

## 2023-08-22 DIAGNOSIS — R03.0 ELEVATED BLOOD PRESSURE READING: ICD-10-CM

## 2023-08-22 DIAGNOSIS — I10 ESSENTIAL HYPERTENSION: ICD-10-CM

## 2023-08-22 DIAGNOSIS — M79.644 PAIN OF FINGER OF RIGHT HAND: ICD-10-CM

## 2023-08-22 DIAGNOSIS — S60.041A CONTUSION OF RIGHT RING FINGER WITHOUT DAMAGE TO NAIL, INITIAL ENCOUNTER: Primary | ICD-10-CM

## 2023-08-22 ASSESSMENT — ENCOUNTER SYMPTOMS
RESPIRATORY NEGATIVE: 1
GASTROINTESTINAL NEGATIVE: 1

## 2023-08-22 NOTE — PROGRESS NOTES
Respiratory: Negative. Cardiovascular: Negative. Gastrointestinal: Negative. Musculoskeletal: Negative. Right ring finger pain at the distal end   Neurological: Negative. Hematological: Negative. Psychiatric/Behavioral: Negative. Physical Exam  Vitals reviewed. Constitutional:       General: She is not in acute distress. Appearance: She is not ill-appearing or toxic-appearing. HENT:      Head: Normocephalic. Cardiovascular:      Rate and Rhythm: Normal rate and regular rhythm. Pulses: Normal pulses. Heart sounds: Normal heart sounds. No murmur heard. Pulmonary:      Effort: Pulmonary effort is normal. No respiratory distress. Breath sounds: Normal breath sounds. Musculoskeletal:         General: Tenderness and signs of injury present. No deformity. Normal range of motion. Arms:       Cervical back: Normal range of motion and neck supple. No rigidity or tenderness. Lymphadenopathy:      Cervical: No cervical adenopathy. Neurological:      Mental Status: She is alert and oriented to person, place, and time. Psychiatric:         Behavior: Behavior normal.         An electronic signature was used to authenticate this note.     --Pierre Goodell, APRN - CNP

## 2023-09-26 LAB — MAMMOGRAPHY, EXTERNAL: NORMAL

## 2023-10-10 ENCOUNTER — OFFICE VISIT (OUTPATIENT)
Dept: URGENT CARE | Age: 73
End: 2023-10-10

## 2023-10-10 VITALS
WEIGHT: 132 LBS | OXYGEN SATURATION: 96 % | DIASTOLIC BLOOD PRESSURE: 74 MMHG | TEMPERATURE: 97.8 F | BODY MASS INDEX: 21.21 KG/M2 | HEART RATE: 86 BPM | HEIGHT: 66 IN | SYSTOLIC BLOOD PRESSURE: 135 MMHG | RESPIRATION RATE: 17 BRPM

## 2023-10-10 DIAGNOSIS — R07.81 RIB PAIN: Primary | ICD-10-CM

## 2023-10-10 RX ORDER — NAPROXEN 500 MG/1
500 TABLET ORAL 2 TIMES DAILY WITH MEALS
Qty: 14 TABLET | Refills: 0 | Status: SHIPPED | OUTPATIENT
Start: 2023-10-10 | End: 2023-10-17

## 2023-10-10 ASSESSMENT — ENCOUNTER SYMPTOMS
GASTROINTESTINAL NEGATIVE: 1
BACK PAIN: 0
RESPIRATORY NEGATIVE: 1

## 2023-10-10 NOTE — PROGRESS NOTES
sounds: Normal heart sounds. No murmur heard. Pulmonary:      Effort: Pulmonary effort is normal. No respiratory distress. Breath sounds: Normal breath sounds. Musculoskeletal:         General: Tenderness present. No swelling or deformity. Cervical back: Normal range of motion and neck supple. No rigidity or tenderness. Right lower leg: No edema. Left lower leg: No edema. Comments: Right anterior rib 6-7 with bony tenderness, no deformity, edema or s/s injury to area - breath sounds clear and without s/s acute distress   Lymphadenopathy:      Cervical: No cervical adenopathy. Skin:     General: Skin is warm. Neurological:      Mental Status: She is alert and oriented to person, place, and time. Psychiatric:         Behavior: Behavior normal.           An electronic signature was used to authenticate this note.     --Suzanne Rodriguez, APRN - CNP

## 2023-10-27 RX ORDER — BUDESONIDE, GLYCOPYRROLATE, AND FORMOTEROL FUMARATE 160; 9; 4.8 UG/1; UG/1; UG/1
2 AEROSOL, METERED RESPIRATORY (INHALATION) 2 TIMES DAILY
Qty: 3 EACH | Refills: 0 | Status: SHIPPED | OUTPATIENT
Start: 2023-10-27

## 2023-12-16 ENCOUNTER — HOSPITAL ENCOUNTER (EMERGENCY)
Age: 73
Discharge: HOME | End: 2023-12-16
Payer: MEDICARE

## 2023-12-16 ENCOUNTER — APPOINTMENT (OUTPATIENT)
Dept: CT IMAGING | Age: 73
End: 2023-12-16
Payer: MEDICARE

## 2023-12-16 ENCOUNTER — APPOINTMENT (OUTPATIENT)
Dept: GENERAL RADIOLOGY | Age: 73
End: 2023-12-16
Payer: MEDICARE

## 2023-12-16 VITALS
TEMPERATURE: 97.3 F | RESPIRATION RATE: 20 BRPM | WEIGHT: 130.2 LBS | HEART RATE: 82 BPM | SYSTOLIC BLOOD PRESSURE: 158 MMHG | DIASTOLIC BLOOD PRESSURE: 86 MMHG | BODY MASS INDEX: 20.44 KG/M2 | OXYGEN SATURATION: 94 % | HEIGHT: 67 IN

## 2023-12-16 DIAGNOSIS — U07.1 COVID-19: Primary | ICD-10-CM

## 2023-12-16 DIAGNOSIS — R91.1 PULMONARY NODULE: ICD-10-CM

## 2023-12-16 LAB
ANION GAP SERPL CALCULATED.3IONS-SCNC: 9 MMOL/L (ref 3–16)
BACTERIA URNS QL MICRO: ABNORMAL /HPF
BASOPHILS # BLD: 0.1 K/UL (ref 0–0.2)
BASOPHILS NFR BLD: 2.3 %
BILIRUB UR QL STRIP.AUTO: NEGATIVE
BUN SERPL-MCNC: 11 MG/DL (ref 7–20)
CALCIUM SERPL-MCNC: 8.6 MG/DL (ref 8.3–10.6)
CHLORIDE SERPL-SCNC: 101 MMOL/L (ref 99–110)
CLARITY UR: CLEAR
CO2 SERPL-SCNC: 28 MMOL/L (ref 21–32)
COLOR UR: YELLOW
CREAT SERPL-MCNC: <0.5 MG/DL (ref 0.6–1.2)
DEPRECATED RDW RBC AUTO: 13.8 % (ref 12.4–15.4)
EOSINOPHIL # BLD: 0 K/UL (ref 0–0.6)
EOSINOPHIL NFR BLD: 0.2 %
EPI CELLS #/AREA URNS HPF: ABNORMAL /HPF (ref 0–5)
FLUAV RNA RESP QL NAA+PROBE: NOT DETECTED
FLUBV RNA RESP QL NAA+PROBE: NOT DETECTED
GFR SERPLBLD CREATININE-BSD FMLA CKD-EPI: >60 ML/MIN/{1.73_M2}
GLUCOSE SERPL-MCNC: 81 MG/DL (ref 70–99)
GLUCOSE UR STRIP.AUTO-MCNC: NEGATIVE MG/DL
HCT VFR BLD AUTO: 45.6 % (ref 36–48)
HGB BLD-MCNC: 15.8 G/DL (ref 12–16)
HGB UR QL STRIP.AUTO: NEGATIVE
KETONES UR STRIP.AUTO-MCNC: ABNORMAL MG/DL
LEUKOCYTE ESTERASE UR QL STRIP.AUTO: NEGATIVE
LYMPHOCYTES # BLD: 1 K/UL (ref 1–5.1)
LYMPHOCYTES NFR BLD: 18.4 %
MCH RBC QN AUTO: 32.1 PG (ref 26–34)
MCHC RBC AUTO-ENTMCNC: 34.6 G/DL (ref 31–36)
MCV RBC AUTO: 92.9 FL (ref 80–100)
MONOCYTES # BLD: 0.7 K/UL (ref 0–1.3)
MONOCYTES NFR BLD: 13.7 %
NEUTROPHILS # BLD: 3.6 K/UL (ref 1.7–7.7)
NEUTROPHILS NFR BLD: 65.4 %
NITRITE UR QL STRIP.AUTO: POSITIVE
PH UR STRIP.AUTO: 5.5 [PH] (ref 5–8)
PLATELET # BLD AUTO: 217 K/UL (ref 135–450)
PMV BLD AUTO: 7.5 FL (ref 5–10.5)
POTASSIUM SERPL-SCNC: 3.7 MMOL/L (ref 3.5–5.1)
PROT UR STRIP.AUTO-MCNC: NEGATIVE MG/DL
RBC # BLD AUTO: 4.91 M/UL (ref 4–5.2)
RBC #/AREA URNS HPF: ABNORMAL /HPF (ref 0–4)
SARS-COV-2 RNA RESP QL NAA+PROBE: DETECTED
SODIUM SERPL-SCNC: 138 MMOL/L (ref 136–145)
SP GR UR STRIP.AUTO: >=1.03 (ref 1–1.03)
UA COMPLETE W REFLEX CULTURE PNL UR: ABNORMAL
UA DIPSTICK W REFLEX MICRO PNL UR: YES
URN SPEC COLLECT METH UR: ABNORMAL
UROBILINOGEN UR STRIP-ACNC: 0.2 E.U./DL
WBC # BLD AUTO: 5.5 K/UL (ref 4–11)
WBC #/AREA URNS HPF: ABNORMAL /HPF (ref 0–5)

## 2023-12-16 PROCEDURE — 6370000000 HC RX 637 (ALT 250 FOR IP): Performed by: NURSE PRACTITIONER

## 2023-12-16 PROCEDURE — 81001 URINALYSIS AUTO W/SCOPE: CPT

## 2023-12-16 PROCEDURE — 71250 CT THORAX DX C-: CPT

## 2023-12-16 PROCEDURE — 87077 CULTURE AEROBIC IDENTIFY: CPT

## 2023-12-16 PROCEDURE — 36415 COLL VENOUS BLD VENIPUNCTURE: CPT

## 2023-12-16 PROCEDURE — 80048 BASIC METABOLIC PNL TOTAL CA: CPT

## 2023-12-16 PROCEDURE — 87086 URINE CULTURE/COLONY COUNT: CPT

## 2023-12-16 PROCEDURE — 71045 X-RAY EXAM CHEST 1 VIEW: CPT

## 2023-12-16 PROCEDURE — 87636 SARSCOV2 & INF A&B AMP PRB: CPT

## 2023-12-16 PROCEDURE — 85025 COMPLETE CBC W/AUTO DIFF WBC: CPT

## 2023-12-16 PROCEDURE — 87186 SC STD MICRODIL/AGAR DIL: CPT

## 2023-12-16 RX ORDER — ACETAMINOPHEN 325 MG/1
650 TABLET ORAL ONCE
Status: COMPLETED | OUTPATIENT
Start: 2023-12-16 | End: 2023-12-16

## 2023-12-16 RX ADMIN — ACETAMINOPHEN 650 MG: 325 TABLET ORAL at 14:37

## 2023-12-16 ASSESSMENT — PAIN DESCRIPTION - ONSET: ONSET: ON-GOING

## 2023-12-16 ASSESSMENT — PAIN - FUNCTIONAL ASSESSMENT
PAIN_FUNCTIONAL_ASSESSMENT: 0-10
PAIN_FUNCTIONAL_ASSESSMENT: ACTIVITIES ARE NOT PREVENTED

## 2023-12-16 ASSESSMENT — PAIN DESCRIPTION - FREQUENCY: FREQUENCY: CONTINUOUS

## 2023-12-16 ASSESSMENT — PAIN DESCRIPTION - LOCATION
LOCATION: GENERALIZED
LOCATION: GENERALIZED

## 2023-12-16 ASSESSMENT — PAIN SCALES - GENERAL
PAINLEVEL_OUTOF10: 9
PAINLEVEL_OUTOF10: 9

## 2023-12-16 ASSESSMENT — PAIN DESCRIPTION - DESCRIPTORS
DESCRIPTORS: ACHING
DESCRIPTORS: ACHING;SORE

## 2023-12-16 ASSESSMENT — PAIN DESCRIPTION - PAIN TYPE: TYPE: ACUTE PAIN

## 2023-12-16 NOTE — DISCHARGE INSTRUCTIONS
CT Chest WO contrast result :   IMPRESSION:  1. No acute pulmonary findings. No acute infiltrate or pneumothorax. 2. Mild pulmonary emphysema. Stable noncalcified pulmonary nodule measuring  up to 4 mm. Imaging follow-up per screening chest CT 03/03/2023. Follow up with pulmonary nodule screening as directed on 3-3-2023. To be ordered by your PCP.

## 2023-12-16 NOTE — ED PROVIDER NOTES
[address] [address] ED  EMERGENCY DEPARTMENT ENCOUNTER        Pt Name: Liliya Shelley  MRN: [id number]  [address] [facility] 1950  Date of evaluation: 12/16/2023  Provider: TERESITA Anand CNP  PCP: GLORY Spears  Note Started: 3:11 PM EST 12/16/23      KATELIN. I have evaluated this patient. CHIEF COMPLAINT       Chief Complaint   Patient presents with    Generalized Body Aches     Started Thursday night     Fever    Cough     Unable to get into the pcp. HISTORY OF PRESENT ILLNESS: 1 or more Elements     History From: Patient     Limitations to history : None    Social Determinants Significantly Affecting Health : None    Chief Complaint: Body aches     Liliya Shelley is a 68 y.o. female who presents to the emergency department today with symptoms of general body aches. Patient began with symptoms of bodyaches and chills last 2 days. States that she denies any fever but states she has body aches which are quite substantial.  States she has aches and pains in her both legs as well as arms and her back. No headache. No neck pain. Occasional cough but reports she has a history of smoking and smoker's cough. Nursing Notes were all reviewed and agreed with or any disagreements were addressed in the HPI. REVIEW OF SYSTEMS :      Review of Systems   Constitutional:  Negative for chills, diaphoresis and fever. HENT:  Negative for congestion, ear pain, rhinorrhea and sore throat. Eyes:  Negative for pain and visual disturbance. Respiratory:  Negative for cough and shortness of breath. Cardiovascular:  Negative for chest pain and leg swelling. Gastrointestinal:  Negative for abdominal pain, blood in stool, diarrhea, nausea and vomiting. Genitourinary:  Negative for difficulty urinating, dysuria, flank pain and frequency. Musculoskeletal:  Positive for myalgias. Negative for back pain and neck pain. Skin:  Negative for rash and wound.    Neurological:

## 2023-12-17 LAB
BACTERIA UR CULT: ABNORMAL
ORGANISM: ABNORMAL

## 2024-01-11 ENCOUNTER — TELEPHONE (OUTPATIENT)
Dept: PULMONOLOGY | Age: 74
End: 2024-01-11

## 2024-01-11 RX ORDER — BUDESONIDE, GLYCOPYRROLATE, AND FORMOTEROL FUMARATE 160; 9; 4.8 UG/1; UG/1; UG/1
2 AEROSOL, METERED RESPIRATORY (INHALATION) 2 TIMES DAILY
Qty: 2 EACH | Refills: 0 | Status: SHIPPED | COMMUNITY
Start: 2024-01-11

## 2024-01-11 NOTE — TELEPHONE ENCOUNTER
Patient called the office requesting 3 [facility] samples.  Order pended if appropriate.    Advised the patient that we would call back if samples approved.

## 2024-01-14 SDOH — HEALTH STABILITY: PHYSICAL HEALTH: ON AVERAGE, HOW MANY MINUTES DO YOU ENGAGE IN EXERCISE AT THIS LEVEL?: 60 MIN

## 2024-01-14 SDOH — HEALTH STABILITY: PHYSICAL HEALTH: ON AVERAGE, HOW MANY DAYS PER WEEK DO YOU ENGAGE IN MODERATE TO STRENUOUS EXERCISE (LIKE A BRISK WALK)?: 7 DAYS

## 2024-01-15 ENCOUNTER — OFFICE VISIT (OUTPATIENT)
Dept: FAMILY MEDICINE CLINIC | Age: 74
End: 2024-01-15
Payer: MEDICARE

## 2024-01-15 VITALS
BODY MASS INDEX: 21.46 KG/M2 | HEIGHT: 65 IN | WEIGHT: 128.8 LBS | SYSTOLIC BLOOD PRESSURE: 154 MMHG | HEART RATE: 86 BPM | DIASTOLIC BLOOD PRESSURE: 82 MMHG | OXYGEN SATURATION: 97 %

## 2024-01-15 DIAGNOSIS — R05.3 CHRONIC COUGH: ICD-10-CM

## 2024-01-15 DIAGNOSIS — M85.851 OSTEOPENIA OF BOTH HIPS: ICD-10-CM

## 2024-01-15 DIAGNOSIS — M25.552 PAIN OF LEFT HIP: Primary | ICD-10-CM

## 2024-01-15 DIAGNOSIS — J44.89 ASTHMA WITH COPD: ICD-10-CM

## 2024-01-15 DIAGNOSIS — M85.852 OSTEOPENIA OF BOTH HIPS: ICD-10-CM

## 2024-01-15 PROCEDURE — G8400 PT W/DXA NO RESULTS DOC: HCPCS | Performed by: SURGERY

## 2024-01-15 PROCEDURE — G8484 FLU IMMUNIZE NO ADMIN: HCPCS | Performed by: SURGERY

## 2024-01-15 PROCEDURE — G8420 CALC BMI NORM PARAMETERS: HCPCS | Performed by: SURGERY

## 2024-01-15 PROCEDURE — G8427 DOCREV CUR MEDS BY ELIG CLIN: HCPCS | Performed by: SURGERY

## 2024-01-15 PROCEDURE — 1090F PRES/ABSN URINE INCON ASSESS: CPT | Performed by: SURGERY

## 2024-01-15 PROCEDURE — 1123F ACP DISCUSS/DSCN MKR DOCD: CPT | Performed by: SURGERY

## 2024-01-15 PROCEDURE — 3023F SPIROM DOC REV: CPT | Performed by: SURGERY

## 2024-01-15 PROCEDURE — 99204 OFFICE O/P NEW MOD 45 MIN: CPT | Performed by: SURGERY

## 2024-01-15 PROCEDURE — 3017F COLORECTAL CA SCREEN DOC REV: CPT | Performed by: SURGERY

## 2024-01-15 PROCEDURE — 4004F PT TOBACCO SCREEN RCVD TLK: CPT | Performed by: SURGERY

## 2024-01-15 SDOH — ECONOMIC STABILITY: FOOD INSECURITY: WITHIN THE PAST 12 MONTHS, YOU WORRIED THAT YOUR FOOD WOULD RUN OUT BEFORE YOU GOT MONEY TO BUY MORE.: SOMETIMES TRUE

## 2024-01-15 SDOH — ECONOMIC STABILITY: HOUSING INSECURITY
IN THE LAST 12 MONTHS, WAS THERE A TIME WHEN YOU DID NOT HAVE A STEADY PLACE TO SLEEP OR SLEPT IN A SHELTER (INCLUDING NOW)?: NO

## 2024-01-15 SDOH — ECONOMIC STABILITY: FOOD INSECURITY: WITHIN THE PAST 12 MONTHS, THE FOOD YOU BOUGHT JUST DIDN'T LAST AND YOU DIDN'T HAVE MONEY TO GET MORE.: NEVER TRUE

## 2024-01-15 SDOH — ECONOMIC STABILITY: FOOD INSECURITY: WITHIN THE PAST 12 MONTHS, YOU WORRIED THAT YOUR FOOD WOULD RUN OUT BEFORE YOU GOT MONEY TO BUY MORE.: NEVER TRUE

## 2024-01-15 SDOH — ECONOMIC STABILITY: INCOME INSECURITY: HOW HARD IS IT FOR YOU TO PAY FOR THE VERY BASICS LIKE FOOD, HOUSING, MEDICAL CARE, AND HEATING?: NOT HARD AT ALL

## 2024-01-15 ASSESSMENT — PATIENT HEALTH QUESTIONNAIRE - PHQ9
SUM OF ALL RESPONSES TO PHQ9 QUESTIONS 1 & 2: 0
SUM OF ALL RESPONSES TO PHQ QUESTIONS 1-9: 0
SUM OF ALL RESPONSES TO PHQ QUESTIONS 1-9: 0
1. LITTLE INTEREST OR PLEASURE IN DOING THINGS: 0
SUM OF ALL RESPONSES TO PHQ QUESTIONS 1-9: 0
2. FEELING DOWN, DEPRESSED OR HOPELESS: 0
SUM OF ALL RESPONSES TO PHQ QUESTIONS 1-9: 0

## 2024-01-15 ASSESSMENT — ENCOUNTER SYMPTOMS
WHEEZING: 0
BACK PAIN: 1
CHEST TIGHTNESS: 0
ABDOMINAL PAIN: 0
SHORTNESS OF BREATH: 0
NAUSEA: 0
SORE THROAT: 0
CONSTIPATION: 0
COUGH: 1
DIARRHEA: 0

## 2024-01-15 NOTE — PROGRESS NOTES
Lack of Transportation (Non-Medical): No   Physical Activity: Sufficiently Active (1/14/2024)    Exercise Vital Sign     Days of Exercise per Week: 7 days     Minutes of Exercise per Session: 60 min   Stress: Not on file   Social Connections: Not on file   Intimate Partner Violence: Not on file   Housing Stability: Unknown (1/15/2024)    Housing Stability Vital Sign     Unable to Pay for Housing in the Last Year: Not on file     Number of Places Lived in the Last Year: Not on file     Unstable Housing in the Last Year: No       Family History   Adopted: Yes   Problem Relation Age of Onset    No Known Problems Mother     No Known Problems Father     Other Other         Graves       Current Outpatient Medications   Medication Sig Dispense Refill    Budeson-Glycopyrrol-Formoterol (BREZTRI AEROSPHERE) 160-9-4.8 MCG/ACT AERO Inhale 2 Inhalations into the lungs in the morning and at bedtime 2 each 0    busPIRone (BUSPAR) 5 MG tablet Take 1 tablet by mouth 2 times daily as needed      estradiol (ESTRACE) 0.1 MG/GM vaginal cream Insert 2g into vagina nightly x 1 week, then twice weekly thereafter      ezetimibe (ZETIA) 10 MG tablet ZETIA 10 MG TABS      MELATONIN PO Take by mouth nightly      vitamin D3 (CHOLECALCIFEROL) 10 MCG (400 UNIT) TABS tablet Take 1 tablet by mouth daily      NIFEdipine (PROCARDIA XL) 60 MG extended release tablet Take 1 tablet by mouth daily      albuterol sulfate HFA (PROVENTIL HFA) 108 (90 Base) MCG/ACT inhaler INHALE 2 PUFFS BY MOUTH EVERY SIX HOURS AS NEEDED 6.7 g 5    albuterol (PROVENTIL) (2.5 MG/3ML) 0.083% nebulizer solution Take 3 mLs by nebulization every 6 hours as needed for Wheezing or Shortness of Breath 120 each 5    omeprazole (PRILOSEC) 20 MG capsule Take 2 capsules by mouth daily (Patient not taking: Reported on 1/15/2024)       No current facility-administered medications for this visit.       No Known Allergies    Admission on 12/16/2023, Discharged on 12/16/2023   Component

## 2024-01-23 ENCOUNTER — HOSPITAL ENCOUNTER (OUTPATIENT)
Dept: PHYSICAL THERAPY | Age: 74
Setting detail: THERAPIES SERIES
Discharge: HOME | End: 2024-01-23
Payer: MEDICARE

## 2024-01-23 DIAGNOSIS — M25.552 LEFT HIP PAIN: Primary | ICD-10-CM

## 2024-01-23 PROCEDURE — 97110 THERAPEUTIC EXERCISES: CPT | Performed by: PHYSICAL THERAPIST

## 2024-01-23 PROCEDURE — 97161 PT EVAL LOW COMPLEX 20 MIN: CPT | Performed by: PHYSICAL THERAPIST

## 2024-01-23 NOTE — PLAN OF CARE
[] : Resident Poor    Patient requires continued skilled intervention: [x] Yes  [] No      CHARGE CAPTURE     PT CHARGE GRID   CPT Code (TIMED) minutes # CPT Code (UNTIMED) #     Therex (97698)  23 2  EVAL:LOW (10640 - Typically 20 minutes face-to-face) 1    Neuromusc. Re-ed (10735)    Re-Eval (35123)     Manual (18696)    Estim Unattended (50249)     Ther. Act (58881)    Mech. Traction (97594)     Gait (06359)    Dry Needle 1-2 muscle (36757)     Aquatic Therex (11567)    Dry Needle 3+ muscle (20561)     Iontophoresis (34745)    VASO (62930)     Ultrasound (19859)    Group Therapy (85863)     Estim Attended (36001)    Canalith Repositioning (54401)     Other:    Other:    Total Timed Code Tx Minutes 23 2  1     Total Treatment Minutes 40        Charge Justification:  (32467) THERAPEUTIC EXERCISE - Provided verbal/tactile cueing for activities related to strengthening, flexibility, endurance, ROM performed to prevent loss of range of motion, maintain or improve muscular strength or increase flexibility, following either an injury or surgery.       GOALS     Patient stated goal: to get stronger  Status:  [] Progressing: [] Met: [] Not Met: [] Adjusted    Therapist goals for Patient:   Short Term Goals: To be achieved in: 2 weeks  Independent in HEP and progression per patient tolerance, in order to progress toward full function and prevent re-injury.    Status: [] Progressing: [] Met: [] Not Met: [] Adjusted  Patient will have a decrease in pain to 0/10 to help facilitate improvement in movement, function, and ADLs as indicated by functional deficits.   Status: [] Progressing: [] Met: [] Not Met: [] Adjusted    Long Term Goals: To be achieved in: 4 weeks  Disability index score of 8% or less for the WOMAC to assist with return top prior level of function.   Status: [] Progressing: [] Met: [] Not Met: [] Adjusted  LLE AROM = RLE AROM in hip rotation to allow for proper joint functioning as indicated by patients functional

## 2024-01-25 RX ORDER — ALBUTEROL SULFATE 90 UG/1
AEROSOL, METERED RESPIRATORY (INHALATION)
Qty: 6.7 G | Refills: 5 | Status: SHIPPED | OUTPATIENT
Start: 2024-01-25

## 2024-01-25 NOTE — TELEPHONE ENCOUNTER
Last office visit 4/17/2023     Last written -     Next office visit scheduled 4/30/2024    Requested Prescriptions     Pending Prescriptions Disp Refills    albuterol sulfate HFA (PROVENTIL HFA) 108 (90 Base) MCG/ACT inhaler 6.7 g 5     Sig: INHALE 2 PUFFS BY MOUTH EVERY SIX HOURS AS NEEDED

## 2024-01-29 ENCOUNTER — OFFICE VISIT (OUTPATIENT)
Dept: FAMILY MEDICINE CLINIC | Age: 74
End: 2024-01-29
Payer: MEDICARE

## 2024-01-29 VITALS
OXYGEN SATURATION: 97 % | WEIGHT: 124 LBS | BODY MASS INDEX: 20.66 KG/M2 | DIASTOLIC BLOOD PRESSURE: 86 MMHG | HEIGHT: 65 IN | SYSTOLIC BLOOD PRESSURE: 136 MMHG | HEART RATE: 94 BPM

## 2024-01-29 DIAGNOSIS — J43.2 CENTRILOBULAR EMPHYSEMA (HCC): ICD-10-CM

## 2024-01-29 DIAGNOSIS — J06.9 VIRAL URI: Primary | ICD-10-CM

## 2024-01-29 DIAGNOSIS — J10.1 INFLUENZA A: ICD-10-CM

## 2024-01-29 LAB
INFLUENZA A ANTIGEN, POC: NORMAL
INFLUENZA B ANTIGEN, POC: NORMAL

## 2024-01-29 PROCEDURE — G8427 DOCREV CUR MEDS BY ELIG CLIN: HCPCS | Performed by: SURGERY

## 2024-01-29 PROCEDURE — 99213 OFFICE O/P EST LOW 20 MIN: CPT | Performed by: SURGERY

## 2024-01-29 PROCEDURE — 1123F ACP DISCUSS/DSCN MKR DOCD: CPT | Performed by: SURGERY

## 2024-01-29 PROCEDURE — G8420 CALC BMI NORM PARAMETERS: HCPCS | Performed by: SURGERY

## 2024-01-29 PROCEDURE — G8400 PT W/DXA NO RESULTS DOC: HCPCS | Performed by: SURGERY

## 2024-01-29 PROCEDURE — 4004F PT TOBACCO SCREEN RCVD TLK: CPT | Performed by: SURGERY

## 2024-01-29 PROCEDURE — 87804 INFLUENZA ASSAY W/OPTIC: CPT | Performed by: SURGERY

## 2024-01-29 PROCEDURE — 3017F COLORECTAL CA SCREEN DOC REV: CPT | Performed by: SURGERY

## 2024-01-29 PROCEDURE — G8484 FLU IMMUNIZE NO ADMIN: HCPCS | Performed by: SURGERY

## 2024-01-29 PROCEDURE — 1090F PRES/ABSN URINE INCON ASSESS: CPT | Performed by: SURGERY

## 2024-01-29 PROCEDURE — 3023F SPIROM DOC REV: CPT | Performed by: SURGERY

## 2024-01-29 RX ORDER — AZITHROMYCIN 250 MG/1
250 TABLET, FILM COATED ORAL SEE ADMIN INSTRUCTIONS
Qty: 6 TABLET | Refills: 0 | Status: SHIPPED | OUTPATIENT
Start: 2024-01-29 | End: 2024-02-03

## 2024-01-29 RX ORDER — PREDNISONE 20 MG/1
20 TABLET ORAL DAILY
Qty: 5 TABLET | Refills: 0 | Status: SHIPPED | OUTPATIENT
Start: 2024-01-29 | End: 2024-02-03

## 2024-01-29 NOTE — PROGRESS NOTES
1/29/2024    This is a 73 y.o. female   Chief Complaint   Patient presents with    Fever     Body pain, sore throat, cough, some mild RT ear plugged, back pain    Cough   .    Here was 4-day history of sore throat myalgias cough congestion.  She notes her daughter has been sick as well as her granddaughter she has been trying to stay away from them but apparently was not successful.  Rapid flu test was positive for influenza A however she is outside of the time window for Tamiflu.  Because of her breathing on not tolerating her albuterol at the moment due to it hurting her throat significantly we will give packing prednisone to help.         Patient Active Problem List   Diagnosis    Asthma with COPD    Shortness of breath    Tobacco abuse    Degeneration of cervical intervertebral disc    Cervical radiculopathy, chronic    Displacement of cervical intervertebral disc without myelopathy    Cervical spondylosis without myelopathy    GERD (gastroesophageal reflux disease)    Coronary artery calcification seen on CT scan    Centrilobular emphysema (HCC)    Numbness and tingling    Intracranial aneurysm       Current Outpatient Medications   Medication Sig Dispense Refill    azithromycin (ZITHROMAX) 250 MG tablet Take 1 tablet by mouth See Admin Instructions for 5 days 500mg on day 1 followed by 250mg on days 2 - 5 6 tablet 0    predniSONE (DELTASONE) 20 MG tablet Take 1 tablet by mouth daily for 5 days 5 tablet 0    albuterol sulfate HFA (PROVENTIL HFA) 108 (90 Base) MCG/ACT inhaler INHALE 2 PUFFS BY MOUTH EVERY SIX HOURS AS NEEDED 6.7 g 5    Budeson-Glycopyrrol-Formoterol (BREZTRI AEROSPHERE) 160-9-4.8 MCG/ACT AERO Inhale 2 Inhalations into the lungs in the morning and at bedtime 2 each 0    busPIRone (BUSPAR) 5 MG tablet Take 1 tablet by mouth 2 times daily as needed      estradiol (ESTRACE) 0.1 MG/GM vaginal cream Insert 2g into vagina nightly x 1 week, then twice weekly thereafter      ezetimibe (ZETIA) 10 MG

## 2024-01-30 ENCOUNTER — APPOINTMENT (OUTPATIENT)
Dept: PHYSICAL THERAPY | Age: 74
End: 2024-01-30
Payer: MEDICARE

## 2024-02-06 ENCOUNTER — HOSPITAL ENCOUNTER (OUTPATIENT)
Dept: PHYSICAL THERAPY | Age: 74
Setting detail: THERAPIES SERIES
Discharge: HOME | End: 2024-02-06
Payer: MEDICARE

## 2024-02-06 PROCEDURE — 97110 THERAPEUTIC EXERCISES: CPT | Performed by: PHYSICAL THERAPIST

## 2024-02-06 PROCEDURE — 97140 MANUAL THERAPY 1/> REGIONS: CPT | Performed by: PHYSICAL THERAPIST

## 2024-02-06 NOTE — FLOWSHEET NOTE
[facility]- Outpatient Rehabilitation and Therapy  [address], [place] [address] office: [phone] fax: [phone]         Physical Therapy: TREATMENT/PROGRESS NOTE   Patient: Lily Anne (73 y.o. female)   Examination Date: 2024   :  1950 MRN: [id number]   Visit #: 2   Insurance Allowable Auth Needed   MN []Yes    [x]No    Insurance: Payor: MEDICARE / Plan: MEDICARE PART A AND B / Product Type: *No Product type* /   Insurance ID: [id number] - (Medicare)  Secondary Insurance (if applicable): MED[employer] M[employer]   Treatment Diagnosis:     ICD-10-CM    1. Left hip pain  M25.552          Medical Diagnosis:  Pain of left hip [M25.552]   Referring Physician: Mily Galarza DO  PCP: Mily Galarza DO       Plan of care signed (Y/N): YES    Date of Patient follow up with Physician: ANAHI     Progress Report/POC: NO  POC update due: (10 visits /OR AUTH LIMITS, whichever is less)  2024                                             Precautions/ Contra-indications:           Latex allergy:  NO  Pacemaker:    NO  Contraindications for Manipulation: osteoporosis  and recent fracture  Date of Surgery: NA  Other:    Red Flags:  None    C-SSRS Triggered by Intake questionnaire:   [x] No, Questionnaire did not trigger screening.   [] Yes, Patient intake triggered further evaluation      [] C-SSRS Screening completed  [] PCP notified via Plan of Care  [] Emergency services notified     Preferred Language for Healthcare:   [x] English       [] other:    SUBJECTIVE EXAMINATION     Patient stated complaint:  Pt reports she had the flu and di ot do her HEP until the past couple days. Nothing feels any differently.    Pain: past 48 hours off and on between -3/10          OBJECTIVE EXAMINATION     Observation: TTP L TFL, ITB, glue med  Tight L TFL and ITB, myofacial restrictions present w roller stick    Test Measurements: see below       Test used Initial score  24

## 2024-02-13 ENCOUNTER — HOSPITAL ENCOUNTER (OUTPATIENT)
Dept: PHYSICAL THERAPY | Age: 74
Setting detail: THERAPIES SERIES
Discharge: HOME | End: 2024-02-13
Payer: MEDICARE

## 2024-02-13 PROCEDURE — 97110 THERAPEUTIC EXERCISES: CPT | Performed by: PHYSICAL THERAPIST

## 2024-02-13 PROCEDURE — 97140 MANUAL THERAPY 1/> REGIONS: CPT | Performed by: PHYSICAL THERAPIST

## 2024-02-13 NOTE — FLOWSHEET NOTE
L.    Test Measurements: see below       Test used Initial score  1/23/24    Pain Summary VAS 1/10    Functional questionnaire LEFS (65) 19%    90/90 HS RLE      LLE     MMT                                             Exercises/Interventions     Therapeutic Ex (40365)  resistance Reps/Sets/time Notes/Cues/Progressions   VC for H   VC for H      Pull on L LB   R/L incline G stretch L2 5x15\"H          Recumbent bike L0 5'                      Manual Intervention (05323)      Roller stick to L ITB  STM and TPR GMed, TFL, HS  SSupine B ITB stretch  Prone L quad stretch  Prone lumbar and hip rocking  23'                            NMR re-education (80575)   CUES NEEDED                                 Therapeutic Activity (32900)                                        Modalities:    Hot Pack  post session 10' R hip L hip LB    Education/Home Exercise Program: HEP instruction not indicated at this time 2/3/24 plan to advance strength NV    Access Code: [email or web address]  URL: [email or web address]  Date: 01/23/2024  Prepared by: Marysol Tolentino    Exercises  - Supine Piriformis Stretch with Leg Straight  - 1 x daily - 5 reps - 15 hold  - Seated Hamstring Stretch  - 1 x daily - 10 reps  - Sidelying Hip Abduction  - 1 x daily - 1-2 sets - 10 reps  - Supine Bridge with Mini Swiss Ball Between Knees  - 1 x daily - 1-2 sets - 10 reps    ASSESSMENT   Assessment:   Lily Anne is a 73 y.o. female presenting today to Outpatient PT with signs and symptoms consistent with bilateral hip weakness  and occasional pain.  Pt. presents with the functional impairments and activity limitations listed below and would benefit from Outpatient PT to address the below impairments as well as improve pain, and restore function.     Functional Impairments:   Noted lumbar/proximal hip/LE joint hypomobility    Functional Activity Limitations (from functional questionnaire and intake):  Reduced ability to tolerate prolonged functional

## 2024-02-20 ENCOUNTER — HOSPITAL ENCOUNTER (OUTPATIENT)
Dept: PHYSICAL THERAPY | Age: 74
Setting detail: THERAPIES SERIES
Discharge: HOME | End: 2024-02-20
Payer: MEDICARE

## 2024-02-20 PROCEDURE — 97140 MANUAL THERAPY 1/> REGIONS: CPT | Performed by: PHYSICAL THERAPIST

## 2024-02-20 PROCEDURE — 97110 THERAPEUTIC EXERCISES: CPT | Performed by: PHYSICAL THERAPIST

## 2024-02-20 NOTE — FLOWSHEET NOTE
[facility]- Outpatient Rehabilitation and Therapy  [address], [place] [address] office: [phone] fax: [phone]         Physical Therapy: TREATMENT/PROGRESS NOTE   Patient: Lily Anne (73 y.o. female)   Examination Date: 2024   :  1950 MRN: [id number]   Visit #: 4   Insurance Allowable Auth Needed   MN []Yes    [x]No    Insurance: Payor: MEDICARE / Plan: MEDICARE PART A AND B / Product Type: *No Product type* /   Insurance ID: [id number] - (Medicare)  Secondary Insurance (if applicable): MED[employer] M[employer]UAL   Treatment Diagnosis:     ICD-10-CM    1. Left hip pain  M25.552          Medical Diagnosis:  Pain of left hip [M25.552]   Referring Physician: Mily Karimi DO  PCP: Mily Karimi DO       Plan of care signed (Y/N): YES    Date of Patient follow up with Physician: ANAHI     Progress Report/POC: NO  POC update due: (10 visits /OR AUTH LIMITS, whichever is less)  2024                                             Precautions/ Contra-indications:           Latex allergy:  NO  Pacemaker:    NO  Contraindications for Manipulation: osteoporosis  and recent fracture  Date of Surgery: NA  Other:    Red Flags:  None    C-SSRS Triggered by Intake questionnaire:   [x] No, Questionnaire did not trigger screening.   [] Yes, Patient intake triggered further evaluation      [] C-SSRS Screening completed  [] PCP notified via Plan of Care  [] Emergency services notified     Preferred Language for Healthcare:   [x] English       [] other:    SUBJECTIVE EXAMINATION     Patient stated complaint:  Pt reports that she had a sharp pain in her groin with turning on her LLE this weekend. Overall she feels less tight.     Pain: past 48 hours off and on between -3/10          OBJECTIVE EXAMINATION     Observation: tightness and TTP L ITB/Gmed/pir> RLE    Test Measurements: see below       Test used Initial score  24    Pain Summary VAS 1/10    Functional

## 2024-02-29 ENCOUNTER — HOSPITAL ENCOUNTER (OUTPATIENT)
Dept: PHYSICAL THERAPY | Age: 74
Setting detail: THERAPIES SERIES
Discharge: HOME | End: 2024-02-29
Payer: MEDICARE

## 2024-02-29 PROCEDURE — 97140 MANUAL THERAPY 1/> REGIONS: CPT | Performed by: PHYSICAL THERAPIST

## 2024-02-29 PROCEDURE — 97110 THERAPEUTIC EXERCISES: CPT | Performed by: PHYSICAL THERAPIST

## 2024-02-29 NOTE — PLAN OF CARE
[facility]- Outpatient Rehabilitation and Therapy  [address], [place] [address] office: [phone] fax: [phone]         Physical Therapy Re-Certification Plan of Care    Dear Mily Karimi DO  ,    We had the pleasure of treating the following patient for physical therapy services at [facility] Outpatient Physical Therapy. A summary of our findings can be found in the updated assessment below.  This includes our plan of care.  If you have any questions or concerns regarding these findings, please do not hesitate to contact me at the office phone number checked above.  Thank you for the referral.     Physician Signature:________________________________Date:__________________  By signing above (or electronic signature), therapist's plan is approved by physician      Functional Outcome: LEFS 15%  Lily Anne 1950 continues to present with functional deficits in strength symmetry, flexibility, and endurance of strength  limiting ability with walking on even ground, heavy home activity, yardwork, and horseback riding  .  During therapy this date, patient required verbal cueing and tactile cueing for exercise progression, improving proper muscle recruitment and activation/motor control patterns, and promoting relaxation. Patient will continue to benefit from ongoing evaluation and advanced clinical decision from a Physical Therapist to improve muscle strength and endurance to safely return to OF without symptoms or restrictions.    Overall Response to Treatment:   [x]Patient is responding well to treatment and improvement is noted with regards to goals   []Patient should continue to improve in reasonable time if they continue HEP   []Patient has plateaued and is no longer responding to skilled PT intervention    []Patient is getting worse and would benefit from return to referring MD   []Patient unable to adhere to initial POC   []Other:     Total Visits: 5

## 2024-03-04 ENCOUNTER — TELEPHONE (OUTPATIENT)
Dept: PULMONOLOGY | Age: 74
End: 2024-03-04

## 2024-03-04 NOTE — TELEPHONE ENCOUNTER
[facility] if we can provide more samples. She is able to stop by the office tomorrow, Tues 3/5 to .     Just rescheduled pt's 1yr f/u with Micaela to 4.23.24 at 1:00pm.

## 2024-03-05 ENCOUNTER — HOSPITAL ENCOUNTER (OUTPATIENT)
Dept: PHYSICAL THERAPY | Age: 74
Setting detail: THERAPIES SERIES
Discharge: HOME | End: 2024-03-05
Payer: MEDICARE

## 2024-03-05 PROCEDURE — 97112 NEUROMUSCULAR REEDUCATION: CPT | Performed by: PHYSICAL THERAPIST

## 2024-03-05 PROCEDURE — 97140 MANUAL THERAPY 1/> REGIONS: CPT | Performed by: PHYSICAL THERAPIST

## 2024-03-05 PROCEDURE — 97110 THERAPEUTIC EXERCISES: CPT | Performed by: PHYSICAL THERAPIST

## 2024-03-05 RX ORDER — BUDESONIDE, GLYCOPYRROLATE, AND FORMOTEROL FUMARATE 160; 9; 4.8 UG/1; UG/1; UG/1
2 AEROSOL, METERED RESPIRATORY (INHALATION) 2 TIMES DAILY
Qty: 3 EACH | Refills: 0 | Status: SHIPPED | COMMUNITY
Start: 2024-03-05 | End: 2024-05-04

## 2024-03-05 NOTE — FLOWSHEET NOTE
mobility, ADLs and prior level of function   Status: [x] Progressing: [] Met: [] Not Met: [] Adjusted  Patient will return to  sit for length of time  in bed without increased symptoms or restriction to work towards return to prior level of function.        Status: [x] Progressing: [] Met: [] Not Met: [] Adjusted  Pt libertad be able to get up into the saddle from either side 50% easier.             Status: [] Progressing: [] Met: [] Not Met: [] Adjusted    TREATMENT PLAN     Frequency/Duration: 1-2x/week for 4 weeks for the following treatment interventions:    Interventions:  [x] Therapeutic exercise including: strength training, ROM, including postural re-education.   [x] NMR activation and proprioception, including postural re-education.    [x] Manual therapy as indicated to include: PROM, Gr I-IV mobilizations, and STM  [x] Modalities as needed that may include: Cryotherapy, Electrical Stimulation, and Thermal Agents  [x] Patient education on joint protection, postural re-education, activity modification, progression of HEP.        [] Aquatic Therapy    Plan:  Next visit cont LE strength and add lateral posterior chain/core stability.      Electronically Signed by Marysol Tolentino, [place]  [id number]                                                                                 Date: 03/05/2024

## 2024-03-12 ENCOUNTER — HOSPITAL ENCOUNTER (OUTPATIENT)
Dept: PHYSICAL THERAPY | Age: 74
Setting detail: THERAPIES SERIES
Discharge: HOME | End: 2024-03-12
Payer: MEDICARE

## 2024-03-12 PROCEDURE — 97110 THERAPEUTIC EXERCISES: CPT | Performed by: PHYSICAL THERAPIST

## 2024-03-12 PROCEDURE — 97140 MANUAL THERAPY 1/> REGIONS: CPT | Performed by: PHYSICAL THERAPIST

## 2024-03-12 NOTE — FLOWSHEET NOTE
[facility]- Outpatient Rehabilitation and Therapy  [address], [place] [address] office: [phone] fax: [phone]               Physical Therapy: TREATMENT/PROGRESS NOTE   Patient: Lily Anne (73 y.o. female)   Examination Date: 2024   :  1950 MRN: [id number]   Visit #: 7   Insurance Allowable Auth Needed   MN []Yes    [x]No    Insurance: Payor: MEDICARE / Plan: MEDICARE PART A AND B / Product Type: *No Product type* /   Insurance ID: [id number] - (Medicare)  Secondary Insurance (if applicable): MED[employer] M[employer]   Treatment Diagnosis:     ICD-10-CM    1. Left hip pain  M25.552          Medical Diagnosis:  Pain of left hip [M25.552]   Referring Physician: Mily Karimi DO  PCP: Mily Karimi DO       Plan of care signed (Y/N): YES/update YES    Date of Patient follow up with Physician: ANAHI     Progress Report/POC: NO  POC update due: (10 visits /OR AUTH LIMITS, whichever is less)  3/22/2024                                             Precautions/ Contra-indications:           Latex allergy:  NO  Pacemaker:    NO  Contraindications for Manipulation: osteoporosis  and recent fracture  Date of Surgery: NA  Other:    Red Flags:  None    C-SSRS Triggered by Intake questionnaire:   [x] No, Questionnaire did not trigger screening.   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 She has some pain w walking.    Pain: 1/10 groin today        OBJECTIVE EXAMINATION   Pt arrived late for appt this visit    Observation: tightness and TTP L ITB/Gmed/pir> RLE    Test Measurements: see below       Test used Initial score  24   Pain

## 2024-03-19 ENCOUNTER — HOSPITAL ENCOUNTER (OUTPATIENT)
Dept: PHYSICAL THERAPY | Age: 74
Setting detail: THERAPIES SERIES
Discharge: HOME | End: 2024-03-19
Payer: MEDICARE

## 2024-03-19 PROCEDURE — 97140 MANUAL THERAPY 1/> REGIONS: CPT | Performed by: PHYSICAL THERAPIST

## 2024-03-19 PROCEDURE — 97110 THERAPEUTIC EXERCISES: CPT | Performed by: PHYSICAL THERAPIST

## 2024-03-19 NOTE — FLOWSHEET NOTE
[facility]- Outpatient Rehabilitation and Therapy  [address], [place] [address] office: [phone] fax: [phone]               Physical Therapy: TREATMENT/PROGRESS NOTE   Patient: Lily Anne (73 y.o. female)   Examination Date: 2024   :  1950 MRN: [id number]   Visit #: 8   Insurance Allowable Auth Needed   MN []Yes    [x]No    Insurance: Payor: MEDICARE / Plan: MEDICARE PART A AND B / Product Type: *No Product type* /   Insurance ID: [id number] - (Medicare)  Secondary Insurance (if applicable): MEDICAL M[employer]UAL   Treatment Diagnosis:     ICD-10-CM    1. Left hip pain  M25.552          Medical Diagnosis:  Pain of left hip [M25.552]   Referring Physician: Mily Karimi DO  PCP: Mily Karimi DO       Plan of care signed (Y/N): YES/update YES    Date of Patient follow up with Physician: ANAHI     Progress Report/POC: NO  POC update due: (10 visits /OR AUTH LIMITS, whichever is less)  3/22/2024                                             Precautions/ Contra-indications:           Latex allergy:  NO  Pacemaker:    NO  Contraindications for Manipulation: osteoporosis  and recent fracture  Date of Surgery: NA  Other:    Red Flags:  None    C-SSRS Triggered by Intake questionnaire:   [x] No, Questionnaire did not trigger screening.   [] Yes, Patient intake triggered further evaluation      [] C-SSRS Screening completed  [] PCP notified via Plan of Care  [] Emergency services notified     Preferred Language for Healthcare:   [x] English       [] other:    SUBJECTIVE EXAMINATION     Patient stated complaint:  Pt repots that her groin is feeling better but now she is \"throwing her back out.\" She overall feels better. 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    Pain: 3/10 lower back, 1/10 L glute        OBJECTIVE EXAMINATION       Observation: tightness and TTP L ITB/Gmed/pir> RLE. Continued tightness overall

## 2024-03-26 ENCOUNTER — APPOINTMENT (OUTPATIENT)
Dept: PHYSICAL THERAPY | Age: 74
End: 2024-03-26
Payer: MEDICARE

## 2024-04-01 ENCOUNTER — HOSPITAL ENCOUNTER (OUTPATIENT)
Dept: CT IMAGING | Age: 74
Discharge: HOME | End: 2024-04-01
Attending: INTERNAL MEDICINE
Payer: MEDICARE

## 2024-04-01 ENCOUNTER — HOSPITAL ENCOUNTER (OUTPATIENT)
Dept: MRI IMAGING | Age: 74
Discharge: HOME | End: 2024-04-01
Payer: MEDICARE

## 2024-04-01 DIAGNOSIS — Z87.891 PERSONAL HISTORY OF TOBACCO USE: ICD-10-CM

## 2024-04-01 DIAGNOSIS — I67.1 CEREBRAL ANEURYSM: ICD-10-CM

## 2024-04-01 PROCEDURE — 70544 MR ANGIOGRAPHY HEAD W/O DYE: CPT

## 2024-04-01 PROCEDURE — 71271 CT THORAX LUNG CANCER SCR C-: CPT

## 2024-04-02 ENCOUNTER — HOSPITAL ENCOUNTER (OUTPATIENT)
Dept: PHYSICAL THERAPY | Age: 74
Setting detail: THERAPIES SERIES
Discharge: HOME | End: 2024-04-02
Payer: MEDICARE

## 2024-04-02 PROCEDURE — 97110 THERAPEUTIC EXERCISES: CPT | Performed by: PHYSICAL THERAPIST

## 2024-04-02 PROCEDURE — 97140 MANUAL THERAPY 1/> REGIONS: CPT | Performed by: PHYSICAL THERAPIST

## 2024-04-02 NOTE — PLAN OF CARE
score  1/23/24 2/29/24 4/2/24  POC   Pain Summary VAS 1/10 0-3/10 0-3/10   Functional questionnaire LEFS (65) 19% (68) 15% (59) 26%   90/90 HS RLE  30     LLE              30    MMT LHAB  4-/5 4/5 weakness     RHAB  4/5 4 to 4+/5 weakness    LKE  4+/5 4+/5    LKF  4/5 4+/5    RKE  4+/5 4+/5    RKF  4/5 4+/5    LHF  3+/5 4-/5 difficult    RHF  4-/5 4-/5 difficult                          Exercises/Interventions     Therapeutic Ex (79613)  resistance Reps/Sets/time Notes/Cues/Progressions      B incline G stretch L3 3x20\"\"H    Enmanuel stretch lunge stretch R/L  Captain morgans stretch R/L  5x5\"H ea  5x5\"H ea    Recumbent bike L2 5'/5' L0 at end to cool down For ROM knees and hips         Seated SAQ R/L 6\" bolster 1#           NEY TKE R/L 45# 20x 3\"H ea Felt in calf muscles   LAQ R/L 1# 2x15 ea Difficulty w core stability seated    LP BLE   Ball ADD  VL ABD   80#  GVL   2x10  1x10   Focus on slow and conrolled   Fatigue in Q/HS/G      + HEP   Small steps for picking up feet not dragging  More difficult going to the right  + HEP                  Manual Intervention (67556)      Re evaluation of strength  and ROM to determine course of progression and POC  6'                      NMR re-education (05607)   CUES NEEDED                                 Therapeutic Activity (30723)                                        Modalities:    Hot Pack  post session 10' B ITB/glutes    Education/Home Exercise Program: HEP instruction not indicated at this time3/19/24     Access Code: [email or web address]  URL: [email or web address]  Date: 02/20/2024  Prepared by: Marysol Tolentino    Exercises  - Supine Piriformis Stretch with Leg Straight  - 1 x daily - 5 reps - 15 hold  - Seated Hamstring Stretch  - 1 x daily - 10 reps  - Sidelying Hip Abduction  - 1 x daily - 1-2 sets - 10 reps  - Supine Bridge with Mini Swiss Ball Between Knees  - 1 x daily - 1-2 sets - 10 reps  - Prone Quadriceps Stretch with Strap  - 1 x daily - 5 reps  - Prone Alternating Bent

## 2024-04-16 ENCOUNTER — APPOINTMENT (OUTPATIENT)
Dept: PHYSICAL THERAPY | Age: 74
End: 2024-04-16
Payer: MEDICARE

## 2024-04-22 ENCOUNTER — HOSPITAL ENCOUNTER (OUTPATIENT)
Dept: PHYSICAL THERAPY | Age: 74
Setting detail: THERAPIES SERIES
Discharge: HOME | End: 2024-04-22
Payer: MEDICARE

## 2024-04-22 PROCEDURE — 97110 THERAPEUTIC EXERCISES: CPT | Performed by: PHYSICAL THERAPIST

## 2024-04-22 NOTE — DISCHARGE SUMMARY
if her exercises help. She is getting them in 3-4x weekly. Pt states she feels as if this is muscular issue and she needs to get it stronger.     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     Test Measurements: see below       Test used Initial score  1/23/24 2/29/24 4/2/24  POC/DC 4/22/24   Pain Summary VAS 1/10 0-3/10 0-3/10 2-8/10   Functional questionnaire LEFS (65) 19% (68) 15% (59) 26%    90/90 HS RLE  30      LLE              30     MMT LHAB  4-/5 4/5 weakness      RHAB  4/5 4 to 4+/5 weakness     LKE  4+/5 4+/5     LKF  4/5 4+/5     RKE  4+/5 4+/5     RKF  4/5 4+/5     LHF  3+/5 4-/5 difficult     RHF  4-/5 4-/5 difficult                             Exercises/Interventions     Therapeutic Ex (80298)  resistance Reps/Sets/time Notes/Cues/Progressions   Pt education on the need to do HEP consistently in order to increase endurance and strength.  New HEP issued as well as gym education  8'    B incline G stretch L3 3x20\"\"H          Recumbent bike L2 5'/5' L0 at end to cool down For ROM knees and hips   LP BLE 80# 2x10 HEP   HSC 20# 2x10 HEP   KE 20# 2x10 HEP   Standing HE/HAB/HF  5x ea HEP   BHR  10x HEP   Standing HSC  10x HEP   minisquats  10x HEP                                 Manual Intervention (59214)                              NMR re-education (37620)   CUES NEEDED                                 Therapeutic Activity (93196)                                        Modalities:    Hot Pack  post session 10' B ITB/glutes    Education/Home Exercise Program: HEP discussed and performed, see exercise grid4/22/24 final gym and HEP    Access Code: [email or web address]  URL: [email or web address]  Date: 02/20/2024  Prepared by: Marysol Tolentino    Exercises  - Supine Piriformis Stretch with Leg Straight  - 1 x daily - 5 reps - 15 hold  - Seated Hamstring Stretch  - 1 x daily - 10 reps  - Sidelying Hip Abduction  - 1 x daily -

## 2024-04-23 ENCOUNTER — OFFICE VISIT (OUTPATIENT)
Dept: PULMONOLOGY | Age: 74
End: 2024-04-23
Payer: MEDICARE

## 2024-04-23 VITALS
RESPIRATION RATE: 16 BRPM | OXYGEN SATURATION: 97 % | DIASTOLIC BLOOD PRESSURE: 87 MMHG | WEIGHT: 122 LBS | HEART RATE: 81 BPM | SYSTOLIC BLOOD PRESSURE: 144 MMHG | TEMPERATURE: 97.6 F | BODY MASS INDEX: 20.33 KG/M2 | HEIGHT: 65 IN

## 2024-04-23 DIAGNOSIS — Z72.0 TOBACCO ABUSE: ICD-10-CM

## 2024-04-23 DIAGNOSIS — J43.2 CENTRILOBULAR EMPHYSEMA (HCC): ICD-10-CM

## 2024-04-23 DIAGNOSIS — I25.10 CORONARY ARTERY CALCIFICATION SEEN ON CT SCAN: Primary | ICD-10-CM

## 2024-04-23 DIAGNOSIS — J44.89 ASTHMA WITH COPD (HCC): ICD-10-CM

## 2024-04-23 DIAGNOSIS — K21.9 GASTROESOPHAGEAL REFLUX DISEASE, UNSPECIFIED WHETHER ESOPHAGITIS PRESENT: ICD-10-CM

## 2024-04-23 DIAGNOSIS — Z87.891 PERSONAL HISTORY OF TOBACCO USE: ICD-10-CM

## 2024-04-23 PROCEDURE — 3023F SPIROM DOC REV: CPT | Performed by: INTERNAL MEDICINE

## 2024-04-23 PROCEDURE — G0296 VISIT TO DETERM LDCT ELIG: HCPCS | Performed by: INTERNAL MEDICINE

## 2024-04-23 PROCEDURE — G8427 DOCREV CUR MEDS BY ELIG CLIN: HCPCS | Performed by: INTERNAL MEDICINE

## 2024-04-23 PROCEDURE — 1123F ACP DISCUSS/DSCN MKR DOCD: CPT | Performed by: INTERNAL MEDICINE

## 2024-04-23 PROCEDURE — 3017F COLORECTAL CA SCREEN DOC REV: CPT | Performed by: INTERNAL MEDICINE

## 2024-04-23 PROCEDURE — G8400 PT W/DXA NO RESULTS DOC: HCPCS | Performed by: INTERNAL MEDICINE

## 2024-04-23 PROCEDURE — 1090F PRES/ABSN URINE INCON ASSESS: CPT | Performed by: INTERNAL MEDICINE

## 2024-04-23 PROCEDURE — G8420 CALC BMI NORM PARAMETERS: HCPCS | Performed by: INTERNAL MEDICINE

## 2024-04-23 PROCEDURE — 99214 OFFICE O/P EST MOD 30 MIN: CPT | Performed by: INTERNAL MEDICINE

## 2024-04-23 PROCEDURE — 4004F PT TOBACCO SCREEN RCVD TLK: CPT | Performed by: INTERNAL MEDICINE

## 2024-04-23 RX ORDER — PREDNISONE 20 MG/1
20 TABLET ORAL DAILY
Qty: 10 TABLET | Refills: 0 | Status: SHIPPED | OUTPATIENT
Start: 2024-04-23 | End: 2024-05-03

## 2024-04-23 RX ORDER — DOXYCYCLINE HYCLATE 100 MG
100 TABLET ORAL 2 TIMES DAILY
Qty: 14 TABLET | Refills: 0 | Status: SHIPPED | OUTPATIENT
Start: 2024-04-23 | End: 2024-04-30

## 2024-04-23 RX ORDER — BUDESONIDE, GLYCOPYRROLATE, AND FORMOTEROL FUMARATE 160; 9; 4.8 UG/1; UG/1; UG/1
2 AEROSOL, METERED RESPIRATORY (INHALATION) 2 TIMES DAILY
Qty: 4 EACH | Refills: 0 | Status: SHIPPED | COMMUNITY
Start: 2024-04-23 | End: 2024-06-22

## 2024-04-23 RX ORDER — ALBUTEROL SULFATE 90 UG/1
AEROSOL, METERED RESPIRATORY (INHALATION)
Qty: 6.7 G | Refills: 5 | Status: SHIPPED | OUTPATIENT
Start: 2024-04-23

## 2024-04-23 NOTE — PROGRESS NOTES
Discussed with the patient the current USPSTF guidelines released March 9, 2021 for screening for lung cancer.    For adults aged 50 to 80 years who have a 20 pack-year smoking history and currently smoke or have quit within the past 15 years the grade B recommendation is to:  Screen for lung cancer with low-dose computed tomography (LDCT) every year.  Stop screening once a person has not smoked for 15 years or has a health problem that limits life expectancy or the ability to have lung surgery.    The patient  reports that she has been smoking cigarettes. She started smoking about 58 years ago. She has a 58.0 pack-year smoking history. She has never used smokeless tobacco.. Discussed with patient the risks and benefits of screening, including over-diagnosis, false positive rate, and total radiation exposure.  The patient currently exhibits no signs or symptoms suggestive of lung cancer.  Discussed with patient the importance of compliance with yearly annual lung cancer screenings and willingness to undergo diagnosis and treatment if screening scan is positive.  In addition, the patient was counseled regarding the importance of remaining smoke free and/or total smoking cessation.    Also reviewed the following if the patient has Medicare that as of February 10, 2022, Medicare only covers LDCT screening in patients aged 50-77 with at least a 20 pack-year smoking history who currently smoke or have quit in the last 15 years  
MA Communication:  The following orders are received by verbal communication from Darrin Hinojosa MD    Orders include:    1 YR LUNGS  F/U  LDCT  Breztri samples    
statin in the recent past  Diet and lifestyle modification discussed  Patient to continue with PPI as given by PCP  Smoking cessation reinforced  Will continue annual low-dose CT scan of the chest  Steam stimulation will help  A humidifier in the bedroom will help  A HEPA filter at home to help  Patient further management depending on patient's clinical status and the follow-up on above recommendations and follow-up low-dose CT of the chest      Discussed with the patient the current USPSTF guidelines released March 9, 2021 for screening for lung cancer.    For adults aged 50 to 80 years who have a 20 pack-year smoking history and currently smoke or have quit within the past 15 years the grade B recommendation is to:  Screen for lung cancer with low-dose computed tomography (LDCT) every year.  Stop screening once a person has not smoked for 15 years or has a health problem that limits life expectancy or the ability to have lung surgery.    The patient  reports that she has been smoking cigarettes. She started smoking about 58 years ago. She has a 58.0 pack-year smoking history. She has never used smokeless tobacco.. Discussed with patient the risks and benefits of screening, including over-diagnosis, false positive rate, and total radiation exposure.  The patient currently exhibits no signs or symptoms suggestive of lung cancer.  Discussed with patient the importance of compliance with yearly annual lung cancer screenings and willingness to undergo diagnosis and treatment if screening scan is positive.  In addition, the patient was counseled regarding the importance of remaining smoke free and/or total smoking cessation.    Also reviewed the following if the patient has Medicare that as of February 10, 2022, Medicare only covers LDCT screening in patients aged 50-77 with at least a 20 pack-year smoking history who currently smoke or have quit in the last 15 years

## 2024-05-30 ENCOUNTER — OFFICE VISIT (OUTPATIENT)
Dept: FAMILY MEDICINE CLINIC | Age: 74
End: 2024-05-30

## 2024-05-30 VITALS
HEART RATE: 87 BPM | HEIGHT: 65 IN | WEIGHT: 128 LBS | BODY MASS INDEX: 21.33 KG/M2 | SYSTOLIC BLOOD PRESSURE: 124 MMHG | DIASTOLIC BLOOD PRESSURE: 74 MMHG | OXYGEN SATURATION: 96 %

## 2024-05-30 DIAGNOSIS — R53.83 OTHER FATIGUE: ICD-10-CM

## 2024-05-30 DIAGNOSIS — E55.9 VITAMIN D DEFICIENCY: ICD-10-CM

## 2024-05-30 DIAGNOSIS — B37.0 ORAL THRUSH: Primary | ICD-10-CM

## 2024-05-30 LAB
25(OH)D3 SERPL-MCNC: 86.4 NG/ML
BASOPHILS # BLD: 0 K/UL (ref 0–0.2)
BASOPHILS NFR BLD: 0.4 %
DEPRECATED RDW RBC AUTO: 12.9 % (ref 12.4–15.4)
EOSINOPHIL # BLD: 0.1 K/UL (ref 0–0.6)
EOSINOPHIL NFR BLD: 1.2 %
HCT VFR BLD AUTO: 42.9 % (ref 36–48)
HGB BLD-MCNC: 14.7 G/DL (ref 12–16)
LYMPHOCYTES # BLD: 1.9 K/UL (ref 1–5.1)
LYMPHOCYTES NFR BLD: 22.6 %
MCH RBC QN AUTO: 32.4 PG (ref 26–34)
MCHC RBC AUTO-ENTMCNC: 34.3 G/DL (ref 31–36)
MCV RBC AUTO: 94.5 FL (ref 80–100)
MONOCYTES # BLD: 0.7 K/UL (ref 0–1.3)
MONOCYTES NFR BLD: 8.2 %
NEUTROPHILS # BLD: 5.6 K/UL (ref 1.7–7.7)
NEUTROPHILS NFR BLD: 67.6 %
PLATELET # BLD AUTO: 277 K/UL (ref 135–450)
PMV BLD AUTO: 7.9 FL (ref 5–10.5)
RBC # BLD AUTO: 4.54 M/UL (ref 4–5.2)
TSH SERPL DL<=0.005 MIU/L-ACNC: 1.61 UIU/ML (ref 0.27–4.2)
WBC # BLD AUTO: 8.2 K/UL (ref 4–11)

## 2024-05-30 NOTE — PROGRESS NOTES
5/30/2024    This is a 74 y.o. female   Chief Complaint   Patient presents with    Pharyngitis     Swollen gland Lt side,cough x 1.5wk   .    Visible white patches posterior oropharynx, she does use a steroid inhaler regularly and is not always able to rinse her mouth after use.     Also notes that she was previously on HRT stopped this a year or so ago due to cost.  Notes that her energy level has significantly declined since stopping HRT.  Check some labs that may be contributing to fatigue including thyroid vitamin D and CBC.  We did discuss that the risks of HRT include increased risk of blood clots and cancer and guidelines do not recommend continuing HRT at her age.           Patient Active Problem List   Diagnosis    Asthma with COPD (HCC)    Shortness of breath    Tobacco abuse    Degeneration of cervical intervertebral disc    Cervical radiculopathy, chronic    Displacement of cervical intervertebral disc without myelopathy    Cervical spondylosis without myelopathy    GERD (gastroesophageal reflux disease)    Coronary artery calcification seen on CT scan    Centrilobular emphysema (HCC)    Numbness and tingling    Intracranial aneurysm       Current Outpatient Medications   Medication Sig Dispense Refill    nystatin (MYCOSTATIN) 981065 UNIT/ML suspension Take 5 mLs by mouth 4 times daily for 10 days Retain in mouth as long as possible 200 mL 1    albuterol sulfate HFA (PROVENTIL HFA) 108 (90 Base) MCG/ACT inhaler INHALE 2 PUFFS BY MOUTH EVERY SIX HOURS AS NEEDED 6.7 g 5    Budeson-Glycopyrrol-Formoterol (BREZTRI AEROSPHERE) 160-9-4.8 MCG/ACT AERO Inhale 2 puffs into the lungs 2 times daily Rinse mouth after inhaler use to avoid oral thrush. Use AeroChamber. Review inhaler technique at (use-inhalers.com) 4 each 0    Budeson-Glycopyrrol-Formoterol (BREZTRI AEROSPHERE) 160-9-4.8 MCG/ACT AERO Inhale 2 Inhalations into the lungs in the morning and at bedtime 2 each 0    busPIRone (BUSPAR) 5 MG tablet Take 1

## 2024-06-04 ENCOUNTER — OFFICE VISIT (OUTPATIENT)
Dept: FAMILY MEDICINE CLINIC | Age: 74
End: 2024-06-04
Payer: MEDICARE

## 2024-06-04 ENCOUNTER — TELEPHONE (OUTPATIENT)
Dept: FAMILY MEDICINE CLINIC | Age: 74
End: 2024-06-04

## 2024-06-04 VITALS
HEART RATE: 89 BPM | HEIGHT: 65 IN | OXYGEN SATURATION: 94 % | BODY MASS INDEX: 19.83 KG/M2 | DIASTOLIC BLOOD PRESSURE: 79 MMHG | SYSTOLIC BLOOD PRESSURE: 144 MMHG | WEIGHT: 119 LBS

## 2024-06-04 DIAGNOSIS — M54.2 NECK PAIN ON LEFT SIDE: Primary | ICD-10-CM

## 2024-06-04 PROCEDURE — G8427 DOCREV CUR MEDS BY ELIG CLIN: HCPCS | Performed by: SURGERY

## 2024-06-04 PROCEDURE — 99213 OFFICE O/P EST LOW 20 MIN: CPT | Performed by: SURGERY

## 2024-06-04 PROCEDURE — 1090F PRES/ABSN URINE INCON ASSESS: CPT | Performed by: SURGERY

## 2024-06-04 PROCEDURE — 3077F SYST BP >= 140 MM HG: CPT | Performed by: SURGERY

## 2024-06-04 PROCEDURE — 3078F DIAST BP <80 MM HG: CPT | Performed by: SURGERY

## 2024-06-04 PROCEDURE — 4004F PT TOBACCO SCREEN RCVD TLK: CPT | Performed by: SURGERY

## 2024-06-04 PROCEDURE — G8420 CALC BMI NORM PARAMETERS: HCPCS | Performed by: SURGERY

## 2024-06-04 PROCEDURE — G8400 PT W/DXA NO RESULTS DOC: HCPCS | Performed by: SURGERY

## 2024-06-04 PROCEDURE — 3017F COLORECTAL CA SCREEN DOC REV: CPT | Performed by: SURGERY

## 2024-06-04 PROCEDURE — 1123F ACP DISCUSS/DSCN MKR DOCD: CPT | Performed by: SURGERY

## 2024-06-04 RX ORDER — TIZANIDINE 4 MG/1
4 TABLET ORAL 3 TIMES DAILY PRN
Qty: 30 TABLET | Refills: 0 | Status: SHIPPED | OUTPATIENT
Start: 2024-06-04

## 2024-06-04 NOTE — TELEPHONE ENCOUNTER
Patient is complaining of left side pain in neck, collarbone, under arm. Pain severe enough to affect her sleep last night.      Sending to clinical staff for triage.    Future Appointments   Date Time Provider Department Center   6/4/2024  3:00 PM Mily Karimi DO [facility] [place] [facility]   4/21/2025 11:00 AM [facility]   4/28/2025 11:20 AM Darrin Hinojosa MD [facility]

## 2024-06-04 NOTE — PROGRESS NOTES
6/4/2024    This is a 74 y.o. female   Chief Complaint   Patient presents with    Shoulder Pain     Pain on the LT side of the back of the neck down down to the Lt Arm. Started yesterday after mowing the lawn   .    HPI     Patient Active Problem List   Diagnosis    Asthma with COPD (HCC)    Shortness of breath    Tobacco abuse    Degeneration of cervical intervertebral disc    Cervical radiculopathy, chronic    Displacement of cervical intervertebral disc without myelopathy    Cervical spondylosis without myelopathy    GERD (gastroesophageal reflux disease)    Coronary artery calcification seen on CT scan    Centrilobular emphysema (HCC)    Numbness and tingling    Intracranial aneurysm    Hypertension    Tobacco dependency       Current Outpatient Medications   Medication Sig Dispense Refill    tiZANidine (ZANAFLEX) 4 MG tablet Take 1 tablet by mouth 3 times daily as needed (muscle spasms, pain) 30 tablet 0    albuterol sulfate HFA (PROVENTIL HFA) 108 (90 Base) MCG/ACT inhaler INHALE 2 PUFFS BY MOUTH EVERY SIX HOURS AS NEEDED 6.7 g 5    Budeson-Glycopyrrol-Formoterol (BREZTRI AEROSPHERE) 160-9-4.8 MCG/ACT AERO Inhale 2 Inhalations into the lungs in the morning and at bedtime 2 each 0    busPIRone (BUSPAR) 5 MG tablet Take 1 tablet by mouth 2 times daily as needed      estradiol (ESTRACE) 0.1 MG/GM vaginal cream Insert 2g into vagina nightly x 1 week, then twice weekly thereafter      ezetimibe (ZETIA) 10 MG tablet ZETIA 10 MG TABS      MELATONIN PO Take by mouth nightly      vitamin D3 (CHOLECALCIFEROL) 10 MCG (400 UNIT) TABS tablet Take 1 tablet by mouth daily      NIFEdipine (PROCARDIA XL) 60 MG extended release tablet Take 1 tablet by mouth daily      albuterol (PROVENTIL) (2.5 MG/3ML) 0.083% nebulizer solution Take 3 mLs by nebulization every 6 hours as needed for Wheezing or Shortness of Breath 120 each 5    omeprazole (PRILOSEC) 20 MG capsule Take 2 capsules by mouth daily      alendronate (FOSAMAX) 70 MG

## 2024-06-04 NOTE — TELEPHONE ENCOUNTER
Spoke with patient. Advise patient if pain gets severe before appointment to be seen in ER. Having jaw pain or severe headache or any SOB. Patient did state she had slight SOB last night but got better. Patient also stated she took 8 year old Oxycodone to help with the pain.

## 2024-06-08 SDOH — HEALTH STABILITY: PHYSICAL HEALTH: ON AVERAGE, HOW MANY MINUTES DO YOU ENGAGE IN EXERCISE AT THIS LEVEL?: 100 MIN

## 2024-06-08 SDOH — HEALTH STABILITY: PHYSICAL HEALTH: ON AVERAGE, HOW MANY DAYS PER WEEK DO YOU ENGAGE IN MODERATE TO STRENUOUS EXERCISE (LIKE A BRISK WALK)?: 5 DAYS

## 2024-06-08 ASSESSMENT — LIFESTYLE VARIABLES
HOW MANY STANDARD DRINKS CONTAINING ALCOHOL DO YOU HAVE ON A TYPICAL DAY: 1 OR 2
HOW MANY STANDARD DRINKS CONTAINING ALCOHOL DO YOU HAVE ON A TYPICAL DAY: 1
HOW OFTEN DO YOU HAVE A DRINK CONTAINING ALCOHOL: 3
HOW OFTEN DO YOU HAVE A DRINK CONTAINING ALCOHOL: 2-4 TIMES A MONTH
HOW OFTEN DO YOU HAVE SIX OR MORE DRINKS ON ONE OCCASION: 1

## 2024-06-08 ASSESSMENT — PATIENT HEALTH QUESTIONNAIRE - PHQ9
SUM OF ALL RESPONSES TO PHQ QUESTIONS 1-9: 0
SUM OF ALL RESPONSES TO PHQ QUESTIONS 1-9: 0
2. FEELING DOWN, DEPRESSED OR HOPELESS: NOT AT ALL
SUM OF ALL RESPONSES TO PHQ9 QUESTIONS 1 & 2: 0
SUM OF ALL RESPONSES TO PHQ QUESTIONS 1-9: 0
1. LITTLE INTEREST OR PLEASURE IN DOING THINGS: NOT AT ALL
SUM OF ALL RESPONSES TO PHQ QUESTIONS 1-9: 0

## 2024-06-11 ENCOUNTER — OFFICE VISIT (OUTPATIENT)
Dept: FAMILY MEDICINE CLINIC | Age: 74
End: 2024-06-11
Payer: MEDICARE

## 2024-06-11 VITALS
BODY MASS INDEX: 19.96 KG/M2 | SYSTOLIC BLOOD PRESSURE: 152 MMHG | WEIGHT: 119.8 LBS | HEIGHT: 65 IN | HEART RATE: 78 BPM | DIASTOLIC BLOOD PRESSURE: 83 MMHG | OXYGEN SATURATION: 97 %

## 2024-06-11 DIAGNOSIS — Z00.00 INITIAL MEDICARE ANNUAL WELLNESS VISIT: Primary | ICD-10-CM

## 2024-06-11 DIAGNOSIS — Z78.0 POST-MENOPAUSAL: ICD-10-CM

## 2024-06-11 DIAGNOSIS — K21.9 GASTROESOPHAGEAL REFLUX DISEASE, UNSPECIFIED WHETHER ESOPHAGITIS PRESENT: ICD-10-CM

## 2024-06-11 DIAGNOSIS — F17.200 TOBACCO DEPENDENCY: ICD-10-CM

## 2024-06-11 DIAGNOSIS — I10 PRIMARY HYPERTENSION: ICD-10-CM

## 2024-06-11 DIAGNOSIS — M81.0 AGE-RELATED OSTEOPOROSIS WITHOUT CURRENT PATHOLOGICAL FRACTURE: ICD-10-CM

## 2024-06-11 DIAGNOSIS — J43.2 CENTRILOBULAR EMPHYSEMA (HCC): ICD-10-CM

## 2024-06-11 PROCEDURE — 3077F SYST BP >= 140 MM HG: CPT | Performed by: SURGERY

## 2024-06-11 PROCEDURE — 3079F DIAST BP 80-89 MM HG: CPT | Performed by: SURGERY

## 2024-06-11 PROCEDURE — 3017F COLORECTAL CA SCREEN DOC REV: CPT | Performed by: SURGERY

## 2024-06-11 PROCEDURE — G0438 PPPS, INITIAL VISIT: HCPCS | Performed by: SURGERY

## 2024-06-11 PROCEDURE — 1123F ACP DISCUSS/DSCN MKR DOCD: CPT | Performed by: SURGERY

## 2024-06-11 RX ORDER — ALENDRONATE SODIUM 70 MG/1
70 TABLET ORAL
Qty: 4 TABLET | Refills: 0 | Status: SHIPPED | OUTPATIENT
Start: 2024-06-11

## 2024-06-11 NOTE — PROGRESS NOTES
Medicare Annual Wellness Visit    Lily Anne is here for No chief complaint on file.    Assessment & Plan   Initial Medicare annual wellness visit  Age-related osteoporosis without current pathological fracture  -     alendronate (FOSAMAX) 70 MG tablet; Take 1 tablet by mouth every 7 days, Disp-4 tablet, R-0Normal  Primary hypertension       - on nifedipine 60 mg daily, intermittent control, consider increasing dose or adding second medication  Gastroesophageal reflux disease, unspecified whether esophagitis present       - stable on omeprazole  Centrilobular emphysema (HCC)       - controlled with breztri, nebulizer prn and NILTON  Tobacco dependency       - not ready to quit  Post-menopausal  Recommendations for Preventive Services Due: see orders and patient instructions/AVS.  Recommended screening schedule for the next 5-10 years is provided to the patient in written form: see Patient Instructions/AVS.     No follow-ups on file.     Subjective       Patient's complete Health Risk Assessment and screening values have been reviewed and are found in Flowsheets. The following problems were reviewed today and where indicated follow up appointments were made and/or referrals ordered.    Positive Risk Factor Screenings with Interventions:          Drug Use:   Substance and Sexual Activity   Drug Use Yes    Types: Marijuana (Weed)      DAST-10 Score: 1   Interpretation:  1-2: Low level - Monitor, re-assess at a later date  3-5: Moderate level - Further Investigation  6-8: Substantial level - Intensive Assessment  9-10: Severe level - Intensive Assessment  Interventions:  Patient declined any further intervention or treatment          Dentist Screen:  Have you seen the dentist within the past year?: (!) No    Intervention:  Advised to schedule with their dentist        Advanced Directives:  Do you have a Living Will?: (!) No    Intervention:          Tobacco Use:  Tobacco Use: High Risk (6/11/2024)    Patient

## 2024-06-11 NOTE — PATIENT INSTRUCTIONS

## 2024-07-07 DIAGNOSIS — M81.0 AGE-RELATED OSTEOPOROSIS WITHOUT CURRENT PATHOLOGICAL FRACTURE: ICD-10-CM

## 2024-07-08 RX ORDER — ALENDRONATE SODIUM 70 MG/1
TABLET ORAL
Qty: 4 TABLET | Refills: 0 | Status: SHIPPED | OUTPATIENT
Start: 2024-07-08 | End: 2024-08-15 | Stop reason: SDUPTHER

## 2024-07-08 NOTE — TELEPHONE ENCOUNTER
Last ov 06/11/2024   Future Appointments   Date Time Provider Department Center   4/21/2025 11:00 AM [facility]   4/28/2025 11:20 AM Darrin Hinojosa MD [facility]

## 2024-07-09 DIAGNOSIS — R53.83 OTHER FATIGUE: ICD-10-CM

## 2024-07-09 LAB
ALBUMIN SERPL-MCNC: 4 G/DL (ref 3.4–5)
ALBUMIN/GLOB SERPL: 1.7 {RATIO} (ref 1.1–2.2)
ALP SERPL-CCNC: 85 U/L (ref 40–129)
ALT SERPL-CCNC: 19 U/L (ref 10–40)
ANION GAP SERPL CALCULATED.3IONS-SCNC: 10 MMOL/L (ref 3–16)
AST SERPL-CCNC: 19 U/L (ref 15–37)
BILIRUB SERPL-MCNC: 0.3 MG/DL (ref 0–1)
BUN SERPL-MCNC: 11 MG/DL (ref 7–20)
CALCIUM SERPL-MCNC: 8.9 MG/DL (ref 8.3–10.6)
CHLORIDE SERPL-SCNC: 102 MMOL/L (ref 99–110)
CO2 SERPL-SCNC: 26 MMOL/L (ref 21–32)
CREAT SERPL-MCNC: 0.6 MG/DL (ref 0.6–1.2)
GFR SERPLBLD CREATININE-BSD FMLA CKD-EPI: >90 ML/MIN/{1.73_M2}
GLUCOSE SERPL-MCNC: 84 MG/DL (ref 70–99)
POTASSIUM SERPL-SCNC: 4.5 MMOL/L (ref 3.5–5.1)
PROT SERPL-MCNC: 6.3 G/DL (ref 6.4–8.2)
SODIUM SERPL-SCNC: 138 MMOL/L (ref 136–145)

## 2024-08-15 ENCOUNTER — OFFICE VISIT (OUTPATIENT)
Dept: FAMILY MEDICINE CLINIC | Age: 74
End: 2024-08-15

## 2024-08-15 VITALS
DIASTOLIC BLOOD PRESSURE: 58 MMHG | OXYGEN SATURATION: 98 % | SYSTOLIC BLOOD PRESSURE: 118 MMHG | HEART RATE: 78 BPM | HEIGHT: 65 IN | BODY MASS INDEX: 21.92 KG/M2 | WEIGHT: 131.6 LBS

## 2024-08-15 DIAGNOSIS — M81.0 AGE-RELATED OSTEOPOROSIS WITHOUT CURRENT PATHOLOGICAL FRACTURE: ICD-10-CM

## 2024-08-15 DIAGNOSIS — I89.0 LYMPHEDEMA DUE TO TRAUMA: Primary | ICD-10-CM

## 2024-08-15 RX ORDER — ALENDRONATE SODIUM 70 MG/1
70 TABLET ORAL
Qty: 12 TABLET | Refills: 1 | Status: SHIPPED | OUTPATIENT
Start: 2024-08-15

## 2024-08-15 NOTE — PROGRESS NOTES
8/15/2024    This is a 74 y.o. female   Chief Complaint   Patient presents with    Leg Injury     Wants a to check on the old leg injury  and go over medication   .    Has been having swelling of right lower leg below her scar. It used to go down at night but now it seems to stick around all the time. It is mildly tender anterior to the lower shin bone.  There is no discoloration, blistering, weeping, or disruption in skin integrity.         Patient Active Problem List   Diagnosis    Asthma with COPD (HCC)    Shortness of breath    Tobacco abuse    Degeneration of cervical intervertebral disc    Cervical radiculopathy, chronic    Displacement of cervical intervertebral disc without myelopathy    Cervical spondylosis without myelopathy    GERD (gastroesophageal reflux disease)    Coronary artery calcification seen on CT scan    Centrilobular emphysema (HCC)    Numbness and tingling    Intracranial aneurysm    Hypertension    Tobacco dependency       Current Outpatient Medications   Medication Sig Dispense Refill    alendronate (FOSAMAX) 70 MG tablet Take 1 tablet by mouth every 7 days 12 tablet 1    tiZANidine (ZANAFLEX) 4 MG tablet Take 1 tablet by mouth 3 times daily as needed (muscle spasms, pain) 30 tablet 0    albuterol sulfate HFA (PROVENTIL HFA) 108 (90 Base) MCG/ACT inhaler INHALE 2 PUFFS BY MOUTH EVERY SIX HOURS AS NEEDED 6.7 g 5    Budeson-Glycopyrrol-Formoterol (BREZTRI AEROSPHERE) 160-9-4.8 MCG/ACT AERO Inhale 2 Inhalations into the lungs in the morning and at bedtime 2 each 0    busPIRone (BUSPAR) 5 MG tablet Take 1 tablet by mouth 2 times daily as needed      estradiol (ESTRACE) 0.1 MG/GM vaginal cream Insert 2g into vagina nightly x 1 week, then twice weekly thereafter      ezetimibe (ZETIA) 10 MG tablet ZETIA 10 MG TABS      MELATONIN PO Take by mouth nightly      vitamin D3 (CHOLECALCIFEROL) 10 MCG (400 UNIT) TABS tablet Take 1 tablet by mouth daily      NIFEdipine (PROCARDIA XL) 60 MG extended

## 2024-09-03 ENCOUNTER — HOSPITAL ENCOUNTER (OUTPATIENT)
Dept: PHYSICAL THERAPY | Age: 74
Setting detail: THERAPIES SERIES
Discharge: HOME | End: 2024-09-03
Attending: SURGERY
Payer: MEDICARE

## 2024-09-03 DIAGNOSIS — I89.0 LYMPHEDEMA DUE TO TRAUMA: Primary | ICD-10-CM

## 2024-09-03 PROCEDURE — 97110 THERAPEUTIC EXERCISES: CPT

## 2024-09-03 PROCEDURE — 97161 PT EVAL LOW COMPLEX 20 MIN: CPT

## 2024-09-03 PROCEDURE — 97140 MANUAL THERAPY 1/> REGIONS: CPT

## 2024-09-03 NOTE — PLAN OF CARE
monitoring                                         Precautions/ Contra-indications:           Latex allergy:  NO  Pacemaker:    NO  Contraindications for Manipulation:  see above cervical surgery and aneurysms  Date of Surgery: multiple skin grafts on right LE   Other:    Red Flags:  pt reports that she has two aneurysms in brain- monitoring    Suicide Screening:   The patient did not verbalize a primary behavioral concern, suicidal ideation, suicidal intent, or demonstrate suicidal behaviors.    Preferred Language for Healthcare:  English    SUBJECTIVE EXAMINATION     Patient stated complaint/comment: Pt reports that she has had swelling since injury. Pt reports that she tore the skin to the bone on right lower leg and had multiple grafts.       Test used Initial score  9/3/24 09/03/2024   Pain Summary VAS 0/10    Functional questionnaire LLIS 12/17x20=17%    Other:              Pain:  Pain location: N/A  Patient describes pain to be N/A  Pain decreases with:  N/A  Pain increases with:  N/A     Onset date: since injury to right lower leg  Course of tx: none    CONSERVATIVE TREATMENT:   Patient has tried conservative treatments (compression/exercise/elevation): No  Instructed on self-manual lymphatic drainage techniques (self-MLD): No   Instructed on appropriate skin/nail care practices: No  Compression garments of at least 20-30mmHg:   not wearing  Bandaging:  N/A  Elevation:  N/A  Exercise:  N/A    Occupation/School:  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   No     Hand Dominance:   right    Social Support/Environment:  Lives with: other:  granddaughter lives with her    Family/caregiver support needed prior to current illness: No

## 2024-09-05 ENCOUNTER — HOSPITAL ENCOUNTER (OUTPATIENT)
Dept: PHYSICAL THERAPY | Age: 74
Setting detail: THERAPIES SERIES
Discharge: HOME | End: 2024-09-05
Attending: SURGERY
Payer: MEDICARE

## 2024-09-05 PROCEDURE — 97140 MANUAL THERAPY 1/> REGIONS: CPT

## 2024-09-05 NOTE — FLOWSHEET NOTE
Plan just implemented, too soon (<30days) to assess goals progression   [] Goals require adjustment due to lack of progress  [] Patient is not progressing as expected and requires additional follow up with physician  [] Other:   TREATMENT PLAN     Frequency/Duration: 2x/week for 4-6 weeks for the following treatment interventions:    Interventions:  Therapeutic Exercise (55605) including: strength training, ROM, and functional mobility  Therapeutic Activities (38973) including: functional mobility training and education.  Neuromuscular Re-education (87428) activation and proprioception, including postural re-education.    Gait Training (09301) for normalization of ambulation patterns and AD training.   Manual Therapy (32817) as indicated to include: Gr I-IV mobilizations, Soft Tissue Mobilization, Manual Lymph Drainage, Trigger Point Release, and Myofascial Release  Patient education on techniques for home    Plan: Cont POC- Continue emphasis/focus on reduce/eliminate soft tissue swelling/inflammation/restriction and improving soft tissue extensibility. Next visit plan to progress reps, add new exercises, and continue current phase     Electronically Signed by Tawana Stiles PT  Date: 09/05/2024     Note: Portions of this note have been templated and/or copied from initial evaluation, reassessments and prior notes for documentation efficiency.    Note: If patient does not return for scheduled/recommended follow up visits, this note will serve as a discharge from care along with the most recent update on progress.    Lymphedema Evaluation

## 2024-09-10 ENCOUNTER — HOSPITAL ENCOUNTER (OUTPATIENT)
Dept: PHYSICAL THERAPY | Age: 74
Setting detail: THERAPIES SERIES
Discharge: HOME | End: 2024-09-10
Attending: SURGERY
Payer: MEDICARE

## 2024-09-10 PROCEDURE — 97140 MANUAL THERAPY 1/> REGIONS: CPT

## 2024-09-12 ENCOUNTER — APPOINTMENT (OUTPATIENT)
Dept: PHYSICAL THERAPY | Age: 74
End: 2024-09-12
Attending: SURGERY
Payer: MEDICARE

## 2024-09-16 ENCOUNTER — TELEPHONE (OUTPATIENT)
Dept: PULMONOLOGY | Age: 74
End: 2024-09-16

## 2024-09-16 ENCOUNTER — HOSPITAL ENCOUNTER (OUTPATIENT)
Dept: MRI IMAGING | Age: 74
Discharge: HOME | End: 2024-09-16
Payer: MEDICARE

## 2024-09-16 DIAGNOSIS — I67.1 CEREBRAL ANEURYSM: ICD-10-CM

## 2024-09-16 PROCEDURE — 70544 MR ANGIOGRAPHY HEAD W/O DYE: CPT

## 2024-09-17 ENCOUNTER — APPOINTMENT (OUTPATIENT)
Dept: PHYSICAL THERAPY | Age: 74
End: 2024-09-17
Attending: SURGERY
Payer: MEDICARE

## 2024-09-19 ENCOUNTER — APPOINTMENT (OUTPATIENT)
Dept: PHYSICAL THERAPY | Age: 74
End: 2024-09-19
Attending: SURGERY
Payer: MEDICARE

## 2024-09-20 RX ORDER — BUDESONIDE, GLYCOPYRROLATE, AND FORMOTEROL FUMARATE 160; 9; 4.8 UG/1; UG/1; UG/1
2 AEROSOL, METERED RESPIRATORY (INHALATION) 2 TIMES DAILY
Qty: 1 EACH | Refills: 0 | Status: SHIPPED | COMMUNITY
Start: 2024-09-20

## 2024-11-01 ENCOUNTER — TELEPHONE (OUTPATIENT)
Dept: FAMILY MEDICINE CLINIC | Age: 74
End: 2024-11-01

## 2024-11-01 DIAGNOSIS — F41.9 ANXIETY: ICD-10-CM

## 2024-11-01 DIAGNOSIS — E55.9 VITAMIN D DEFICIENCY: Primary | ICD-10-CM

## 2024-11-01 NOTE — TELEPHONE ENCOUNTER
[facility] [id number] [place], [place] - [address] - P [phone] - F [phone] [[id number]]       vitamin D3 (CHOLECALCIFEROL) 10 MCG (400 UNIT) TABS tablet [[id number]]   Patient said she takes 50,000 iu.  Patient said she needs to take the vit d3 first thing tomorrow morning. She is out of the vit d3.    busPIRone (BUSPAR) 5 MG tablet [[id number]]     8/15/2024     Future Appointments   Date Time Provider Department Center   4/21/2025 11:00 AM [facility] CT [facility] [place] [facility]   4/25/2025 11:00 AM Yaron Mancilla MD [facility]

## 2024-11-04 RX ORDER — ALBUTEROL SULFATE 90 UG/1
INHALANT RESPIRATORY (INHALATION)
Qty: 6.7 G | Refills: 5 | Status: SHIPPED | OUTPATIENT
Start: 2024-11-04

## 2024-11-04 RX ORDER — ERGOCALCIFEROL 1.25 MG/1
50000 CAPSULE, LIQUID FILLED ORAL WEEKLY
Qty: 12 CAPSULE | Refills: 1 | Status: SHIPPED | OUTPATIENT
Start: 2024-11-04

## 2024-11-04 RX ORDER — BUSPIRONE HYDROCHLORIDE 5 MG/1
5 TABLET ORAL 2 TIMES DAILY
Qty: 60 TABLET | Refills: 0 | Status: SHIPPED | OUTPATIENT
Start: 2024-11-04 | End: 2024-12-04

## 2024-11-05 ENCOUNTER — OFFICE VISIT (OUTPATIENT)
Dept: FAMILY MEDICINE CLINIC | Age: 74
End: 2024-11-05

## 2024-11-05 VITALS
TEMPERATURE: 98 F | BODY MASS INDEX: 22.63 KG/M2 | SYSTOLIC BLOOD PRESSURE: 118 MMHG | DIASTOLIC BLOOD PRESSURE: 78 MMHG | WEIGHT: 136 LBS

## 2024-11-05 DIAGNOSIS — Z78.0 POST-MENOPAUSAL: ICD-10-CM

## 2024-11-05 DIAGNOSIS — R53.82 CHRONIC FATIGUE: ICD-10-CM

## 2024-11-05 DIAGNOSIS — M62.830 MUSCLE SPASM OF BACK: ICD-10-CM

## 2024-11-05 DIAGNOSIS — E28.39 ESTROGEN DEFICIENCY: ICD-10-CM

## 2024-11-05 DIAGNOSIS — R10.9 ACUTE LEFT FLANK PAIN: Primary | ICD-10-CM

## 2024-11-05 LAB
BILIRUBIN, POC: NORMAL
BLOOD URINE, POC: NORMAL
CLARITY, POC: CLEAR
COLOR, POC: YELLOW
GLUCOSE URINE, POC: NORMAL MG/DL
KETONES, POC: NORMAL MG/DL
LEUKOCYTE EST, POC: NORMAL
NITRITE, POC: NORMAL
PH, POC: 7
PROTEIN, POC: NORMAL MG/DL
SPECIFIC GRAVITY, POC: 1.01
UROBILINOGEN, POC: 0.2 MG/DL

## 2024-11-05 RX ORDER — CYCLOBENZAPRINE HCL 10 MG
10 TABLET ORAL 3 TIMES DAILY PRN
Qty: 30 TABLET | Refills: 0 | Status: SHIPPED | OUTPATIENT
Start: 2024-11-05 | End: 2024-11-15

## 2024-11-05 NOTE — PROGRESS NOTES
11/5/2024    This is a 74 y.o. female   Chief Complaint   Patient presents with    Lower Back Pain     Left side, not sleeping due to the pain    Neck Pain     Across shoulders   .    HPI     Patient Active Problem List   Diagnosis    Asthma with COPD (HCC)    Shortness of breath    Tobacco abuse    Degeneration of cervical intervertebral disc    Cervical radiculopathy, chronic    Displacement of cervical intervertebral disc without myelopathy    Cervical spondylosis without myelopathy    GERD (gastroesophageal reflux disease)    Coronary artery calcification seen on CT scan    Centrilobular emphysema (HCC)    Numbness and tingling    Intracranial aneurysm    Hypertension    Tobacco dependency       Current Outpatient Medications   Medication Sig Dispense Refill    estrogen, conjugated,-medroxyPROGESTERone (PREMPRO) 0.3-1.5 MG per tablet Take 1 tablet by mouth daily 28 tablet 3    albuterol sulfate HFA (PROVENTIL HFA) 108 (90 Base) MCG/ACT inhaler INHALE 2 PUFFS BY MOUTH EVERY SIX HOURS AS NEEDED 6.7 g 5    vitamin D (ERGOCALCIFEROL) 1.25 MG (64692 UT) CAPS capsule Take 1 capsule by mouth once a week 12 capsule 1    busPIRone (BUSPAR) 5 MG tablet Take 1 tablet by mouth 2 times daily 60 tablet 0    Budeson-Glycopyrrol-Formoterol (BREZTRI AEROSPHERE) 160-9-4.8 MCG/ACT AERO Inhale 2 puffs into the lungs 2 times daily Lot [id number] ex 2/27 1 each 0    alendronate (FOSAMAX) 70 MG tablet Take 1 tablet by mouth every 7 days 12 tablet 1    Budeson-Glycopyrrol-Formoterol (BREZTRI AEROSPHERE) 160-9-4.8 MCG/ACT AERO Inhale 2 Inhalations into the lungs in the morning and at bedtime 2 each 0    busPIRone (BUSPAR) 5 MG tablet Take 1 tablet by mouth 2 times daily as needed      estradiol (ESTRACE) 0.1 MG/GM vaginal cream Insert 2g into vagina nightly x 1 week, then twice weekly thereafter      ezetimibe (ZETIA) 10 MG tablet ZETIA 10 MG TABS      MELATONIN PO Take by mouth nightly      vitamin D3 (CHOLECALCIFEROL) 10 MCG (400

## 2024-11-06 ENCOUNTER — TELEPHONE (OUTPATIENT)
Dept: FAMILY MEDICINE CLINIC | Age: 74
End: 2024-11-06

## 2024-11-06 DIAGNOSIS — R53.82 CHRONIC FATIGUE: ICD-10-CM

## 2024-11-06 DIAGNOSIS — Z78.0 POST-MENOPAUSAL: Primary | ICD-10-CM

## 2024-11-06 DIAGNOSIS — E28.39 ESTROGEN DEFICIENCY: ICD-10-CM

## 2024-11-06 NOTE — TELEPHONE ENCOUNTER
Pt called because her Vit D3 rx was sent to the pharmacy as Vit D2. Please send new Rx for Vit D3 50,000 units once weekly capsule. Susy Estrada.    Problem with the Climarapro patches. Cost too much. Pt would like to see if the pill could be sent to the [facility].     Please call back and advise.

## 2024-11-08 RX ORDER — ESTROGEN,CON/M-PROGEST ACET 0.3-1.5MG
1 TABLET ORAL DAILY
Qty: 28 TABLET | Refills: 3 | Status: SHIPPED | OUTPATIENT
Start: 2024-11-08

## 2025-02-25 DIAGNOSIS — F41.9 ANXIETY: ICD-10-CM

## 2025-02-25 RX ORDER — BUSPIRONE HYDROCHLORIDE 5 MG/1
5 TABLET ORAL 2 TIMES DAILY
Qty: 180 TABLET | Refills: 1 | Status: SHIPPED | OUTPATIENT
Start: 2025-02-25

## 2025-02-25 NOTE — TELEPHONE ENCOUNTER
Lov 11/5/2024    Future Appointments   Date Time Provider Department Center   4/21/2025  2:00 PM [facility]   4/25/2025 11:00 AM Yaron Mancilla [facility]

## 2025-04-21 ENCOUNTER — HOSPITAL ENCOUNTER (OUTPATIENT)
Dept: CT IMAGING | Age: 75
Discharge: HOME | End: 2025-04-21
Attending: INTERNAL MEDICINE
Payer: MEDICARE

## 2025-04-21 DIAGNOSIS — Z87.891 PERSONAL HISTORY OF TOBACCO USE: ICD-10-CM

## 2025-04-21 PROCEDURE — 71271 CT THORAX LUNG CANCER SCR C-: CPT

## 2025-04-25 ENCOUNTER — TELEMEDICINE (OUTPATIENT)
Dept: PULMONOLOGY | Age: 75
End: 2025-04-25
Payer: MEDICARE

## 2025-04-25 DIAGNOSIS — J43.2 CENTRILOBULAR EMPHYSEMA (HCC): ICD-10-CM

## 2025-04-25 DIAGNOSIS — R91.8 MULTIPLE NODULES OF LUNG: Primary | ICD-10-CM

## 2025-04-25 PROCEDURE — 1123F ACP DISCUSS/DSCN MKR DOCD: CPT | Performed by: INTERNAL MEDICINE

## 2025-04-25 PROCEDURE — G2211 COMPLEX E/M VISIT ADD ON: HCPCS | Performed by: INTERNAL MEDICINE

## 2025-04-25 PROCEDURE — 1159F MED LIST DOCD IN RCRD: CPT | Performed by: INTERNAL MEDICINE

## 2025-04-25 PROCEDURE — 99214 OFFICE O/P EST MOD 30 MIN: CPT | Performed by: INTERNAL MEDICINE

## 2025-04-25 RX ORDER — FLUTICASONE PROPIONATE 50 MCG
1 SPRAY, SUSPENSION (ML) NASAL DAILY
COMMUNITY
Start: 2025-01-14

## 2025-04-25 RX ORDER — MONTELUKAST SODIUM 10 MG/1
10 TABLET ORAL DAILY
COMMUNITY
Start: 2025-01-14

## 2025-04-25 NOTE — PATIENT INSTRUCTIONS
Continue Breztri with mouth washing after use  Continue albuterol as needed  Continue Singulair. FDA warning discussed. Recommended to stop if patient notices progressive depression or anxiety.  Low-dose CT scan in 6 months to follow-up on the new nodule.  Congratulated the patient on her efforts for smoking cessation.  Over-the-counter Mucinex  RSV vaccine strongly recommended with exposure to grandchild that goes to the .  Follow-up in 6 months after the CT      Health Maintenance/Preventive measures:        >>  Avoid smoking, vaping or secondhand exposure.  Avoid exposure to irritants, allergens as possible as well as contact with patients with infectious respiratory illness.        >>  Stay up-to-date with influenza & pneumonia vaccines, RSV, & COVID-19 vaccine as recommended by the Advisory Committee on Immunization Practices (ACIP)        >>  Healthy diet and activity as able.        >>  Acid reflux precautions: Head of bed elevation, avoiding tight clothes, avoiding big meals or snacking 3 hours before bedtime, targeting healthy weight.        >>  Practice sleep hygiene measures. Avoid driving or operating heavy machines if tired or sleepy.

## 2025-04-25 NOTE — PROGRESS NOTES
PULMONARY NOTE      Lily Anne   : 1950  MRN: [id number]     Date of Service: 2025    PCP: Mily Karimi DO    Referring provider: No ref. provider found      Chief Complaint   Patient presents with    Follow-up     1 YR CENTRILOBULAR EMPHYSEMA           ASSESSMENT & PLAN       75 y.o. pleasant  female patient with:    Assessment:  COPD/emphysema  Multiple lung nodules  Nicotine dependence. ~60 PY. Quit date: Pending.  LDCT: Indicated.  Multiple trials of quitting and relapse.  CAD    Plan:              Continue Breztri with mouth washing after use  Continue albuterol as needed  Continue Singulair. FDA warning discussed. Recommended to stop if patient notices progressive depression or anxiety.  Low-dose CT scan in 6 months to follow-up on the new nodule.  Congratulated the patient on her efforts for smoking cessation.  Over-the-counter Mucinex  RSV vaccine strongly recommended with exposure to grandchild that goes to the .  Follow-up in 6 months after the CT    I spent total of 40 minutes on this encounter on physical exam, chart review, interpreting labs and images, coordinating care, medical documentation and counseling the patient on diagnosis listed above.     Subjective/Objective       HPI (Encounter: 2025 ):   75-year-old female patient with PMH of CAD, asthma COPD overlap syndrome, tobacco dependence, previously following with Dr. Hinojosa who comes in for follow-up.  Patient was last seen in 2024.  She just completed low-dose CT scan for lung cancer screening on 2025 that showed stable small right upper lobe and right lower lobe nodules with new 4 mm nodule seen in the right upper lobe.  Mild to moderate emphysema noticed.  Scan was scored category 3 probably benign with follow-up CT scan in 6 months recommended.    Patient has been doing relatively well since last visit though she had an exacerbation of her COPD in the winter requiring 10-day

## 2025-08-12 ENCOUNTER — TRANSCRIBE ORDERS (OUTPATIENT)
Dept: ADMINISTRATIVE | Age: 75
End: 2025-08-12

## 2025-08-12 DIAGNOSIS — I67.1 CEREBRAL ANEURYSM: Primary | ICD-10-CM

## 2025-08-28 ENCOUNTER — PATIENT MESSAGE (OUTPATIENT)
Dept: FAMILY MEDICINE CLINIC | Age: 75
End: 2025-08-28

## (undated) DEVICE — ENDO CARRY-ON PROCEDURE KIT INCLUDES SUCTION TUBING, LUBRICANT, GAUZE, BIOHAZARD STICKER, TRANSPORT PAD AND INTERCEPT BEDSIDE KIT.: Brand: ENDO CARRY-ON PROCEDURE KIT

## (undated) DEVICE — CONMED SCOPE SAVER BITE BLOCK, 20X27 MM: Brand: SCOPE SAVER

## (undated) DEVICE — FORCEP BX STD CAP 240CM RAD JAW 4

## (undated) DEVICE — ELECTRODE,RADIOTRANSLUCENT,FOAM,3PK: Brand: MEDLINE

## (undated) DEVICE — CANNULA,OXY,ADULT,SUPERSOFT,W/7'TUB,SC: Brand: MEDLINE INDUSTRIES, INC.

## (undated) DEVICE — YANKAUER,BULB TIP,W/O VENT,RIGID,STERILE: Brand: MEDLINE

## (undated) DEVICE — ENDOSCOPIC KIT 2 12 FT OP4 DE2 GWN SYR